# Patient Record
Sex: FEMALE | Race: WHITE | HISPANIC OR LATINO | Employment: FULL TIME | URBAN - METROPOLITAN AREA
[De-identification: names, ages, dates, MRNs, and addresses within clinical notes are randomized per-mention and may not be internally consistent; named-entity substitution may affect disease eponyms.]

---

## 2023-09-11 ENCOUNTER — LAB (OUTPATIENT)
Dept: LAB | Facility: LAB | Age: 33
End: 2023-09-11
Payer: COMMERCIAL

## 2023-09-11 ENCOUNTER — OFFICE VISIT (OUTPATIENT)
Dept: PRIMARY CARE | Facility: CLINIC | Age: 33
End: 2023-09-11
Payer: COMMERCIAL

## 2023-09-11 VITALS
HEART RATE: 51 BPM | SYSTOLIC BLOOD PRESSURE: 122 MMHG | BODY MASS INDEX: 26.35 KG/M2 | WEIGHT: 188.2 LBS | TEMPERATURE: 97.8 F | HEIGHT: 71 IN | OXYGEN SATURATION: 98 % | DIASTOLIC BLOOD PRESSURE: 72 MMHG

## 2023-09-11 DIAGNOSIS — Z98.84 HISTORY OF GASTRIC BYPASS: ICD-10-CM

## 2023-09-11 DIAGNOSIS — M25.512 ACUTE PAIN OF LEFT SHOULDER: ICD-10-CM

## 2023-09-11 DIAGNOSIS — R00.1 BRADYCARDIA: ICD-10-CM

## 2023-09-11 DIAGNOSIS — Z00.00 ROUTINE HEALTH MAINTENANCE: ICD-10-CM

## 2023-09-11 DIAGNOSIS — M41.9 SCOLIOSIS, UNSPECIFIED SCOLIOSIS TYPE, UNSPECIFIED SPINAL REGION: ICD-10-CM

## 2023-09-11 DIAGNOSIS — B35.4 TINEA CORPORIS: ICD-10-CM

## 2023-09-11 DIAGNOSIS — Z00.00 ROUTINE HEALTH MAINTENANCE: Primary | ICD-10-CM

## 2023-09-11 LAB
ALANINE AMINOTRANSFERASE (SGPT) (U/L) IN SER/PLAS: 33 U/L (ref 7–45)
ALBUMIN (G/DL) IN SER/PLAS: 4.5 G/DL (ref 3.4–5)
ALKALINE PHOSPHATASE (U/L) IN SER/PLAS: 42 U/L (ref 33–110)
ANION GAP IN SER/PLAS: 10 MMOL/L (ref 10–20)
ASPARTATE AMINOTRANSFERASE (SGOT) (U/L) IN SER/PLAS: 29 U/L (ref 9–39)
BILIRUBIN TOTAL (MG/DL) IN SER/PLAS: 0.5 MG/DL (ref 0–1.2)
CALCIUM (MG/DL) IN SER/PLAS: 9.6 MG/DL (ref 8.6–10.3)
CARBON DIOXIDE, TOTAL (MMOL/L) IN SER/PLAS: 29 MMOL/L (ref 21–32)
CHLORIDE (MMOL/L) IN SER/PLAS: 102 MMOL/L (ref 98–107)
CHOLESTEROL (MG/DL) IN SER/PLAS: 164 MG/DL (ref 0–199)
CHOLESTEROL IN HDL (MG/DL) IN SER/PLAS: 62.3 MG/DL
CHOLESTEROL/HDL RATIO: 2.6
CREATININE (MG/DL) IN SER/PLAS: 0.63 MG/DL (ref 0.5–1.05)
ERYTHROCYTE DISTRIBUTION WIDTH (RATIO) BY AUTOMATED COUNT: 11.9 % (ref 11.5–14.5)
ERYTHROCYTE MEAN CORPUSCULAR HEMOGLOBIN CONCENTRATION (G/DL) BY AUTOMATED: 32.5 G/DL (ref 32–36)
ERYTHROCYTE MEAN CORPUSCULAR VOLUME (FL) BY AUTOMATED COUNT: 97 FL (ref 80–100)
ERYTHROCYTES (10*6/UL) IN BLOOD BY AUTOMATED COUNT: 4.31 X10E12/L (ref 4–5.2)
GFR FEMALE: >90 ML/MIN/1.73M2
GLUCOSE (MG/DL) IN SER/PLAS: 84 MG/DL (ref 74–99)
HEMATOCRIT (%) IN BLOOD BY AUTOMATED COUNT: 41.6 % (ref 36–46)
HEMOGLOBIN (G/DL) IN BLOOD: 13.5 G/DL (ref 12–16)
LDL: 89 MG/DL (ref 0–99)
LEUKOCYTES (10*3/UL) IN BLOOD BY AUTOMATED COUNT: 4.1 X10E9/L (ref 4.4–11.3)
PLATELETS (10*3/UL) IN BLOOD AUTOMATED COUNT: 177 X10E9/L (ref 150–450)
POTASSIUM (MMOL/L) IN SER/PLAS: 4.3 MMOL/L (ref 3.5–5.3)
PROTEIN TOTAL: 7.3 G/DL (ref 6.4–8.2)
SODIUM (MMOL/L) IN SER/PLAS: 137 MMOL/L (ref 136–145)
THYROTROPIN (MIU/L) IN SER/PLAS BY DETECTION LIMIT <= 0.05 MIU/L: 3.46 MIU/L (ref 0.44–3.98)
TRIGLYCERIDE (MG/DL) IN SER/PLAS: 66 MG/DL (ref 0–149)
UREA NITROGEN (MG/DL) IN SER/PLAS: 16 MG/DL (ref 6–23)
VLDL: 13 MG/DL (ref 0–40)

## 2023-09-11 PROCEDURE — 36415 COLL VENOUS BLD VENIPUNCTURE: CPT

## 2023-09-11 PROCEDURE — 85027 COMPLETE CBC AUTOMATED: CPT

## 2023-09-11 PROCEDURE — 90471 IMMUNIZATION ADMIN: CPT | Performed by: STUDENT IN AN ORGANIZED HEALTH CARE EDUCATION/TRAINING PROGRAM

## 2023-09-11 PROCEDURE — 90686 IIV4 VACC NO PRSV 0.5 ML IM: CPT | Performed by: STUDENT IN AN ORGANIZED HEALTH CARE EDUCATION/TRAINING PROGRAM

## 2023-09-11 PROCEDURE — 99203 OFFICE O/P NEW LOW 30 MIN: CPT | Performed by: STUDENT IN AN ORGANIZED HEALTH CARE EDUCATION/TRAINING PROGRAM

## 2023-09-11 PROCEDURE — 80053 COMPREHEN METABOLIC PANEL: CPT

## 2023-09-11 PROCEDURE — 1036F TOBACCO NON-USER: CPT | Performed by: STUDENT IN AN ORGANIZED HEALTH CARE EDUCATION/TRAINING PROGRAM

## 2023-09-11 PROCEDURE — 84443 ASSAY THYROID STIM HORMONE: CPT

## 2023-09-11 PROCEDURE — 80061 LIPID PANEL: CPT

## 2023-09-11 RX ORDER — CREATINE MONOHYDRATE 100 %
POWDER (GRAM) MISCELLANEOUS
COMMUNITY

## 2023-09-11 RX ORDER — UBIDECARENONE 75 MG
500 CAPSULE ORAL DAILY
COMMUNITY

## 2023-09-11 RX ORDER — FERROUS SULFATE 325(65) MG
65 TABLET, DELAYED RELEASE (ENTERIC COATED) ORAL
COMMUNITY

## 2023-09-11 RX ORDER — FOLIC ACID 1 MG/1
TABLET ORAL DAILY
COMMUNITY

## 2023-09-11 RX ORDER — NYSTATIN 100000 [USP'U]/G
POWDER TOPICAL 2 TIMES DAILY
Qty: 60 G | Refills: 0 | Status: SHIPPED | OUTPATIENT
Start: 2023-09-11 | End: 2023-09-25

## 2023-09-11 RX ORDER — IBUPROFEN 200 MG
CAPSULE ORAL
COMMUNITY

## 2023-09-11 ASSESSMENT — ENCOUNTER SYMPTOMS
CHILLS: 0
DYSURIA: 0
LIGHT-HEADEDNESS: 0
VOMITING: 0
DIZZINESS: 0
FEVER: 0
ABDOMINAL PAIN: 0
NAUSEA: 0
DIAPHORESIS: 0
SHORTNESS OF BREATH: 0
DIARRHEA: 0

## 2023-09-11 ASSESSMENT — PATIENT HEALTH QUESTIONNAIRE - PHQ9
2. FEELING DOWN, DEPRESSED OR HOPELESS: NOT AT ALL
1. LITTLE INTEREST OR PLEASURE IN DOING THINGS: NOT AT ALL
SUM OF ALL RESPONSES TO PHQ9 QUESTIONS 1 AND 2: 0

## 2023-09-11 NOTE — PROGRESS NOTES
"Subjective   Patient ID: Tanika Amador is a 32 y.o. female who presents for New Patient Visit.  She is here to establish care.  Reports pmh scoliosis as child, cholecystectomy, partial hysterectomy, gastric bypass 5 years ago in [place].     Left shoulder pain present for about a week, [facility] Has felt clicking in the back for last few months. No obvious injury that worsened the shoulder. They also were in car last week driving from [place].     Right breast pain present in mid sternal and then to the right inferior breast region. Feels somewhat like chaffing and having sharp pains. Present for about 2 weeks. Sore and tender to the touch. Pain radiated around right rib region. Hasn't really felt any breast lumps. Some redness under right breast-lotion has helped some.     She has felt more bloated than usual.    Social history: Lifts weights 5 times a week and is quite physically active.  She works as a respiratory therapist.        Review of Systems   Constitutional:  Negative for chills, diaphoresis and fever.   HENT:  Negative for hearing loss.    Eyes:  Negative for visual disturbance.   Respiratory:  Negative for shortness of breath.    Cardiovascular:  Negative for chest pain.   Gastrointestinal:  Negative for abdominal pain, diarrhea, nausea and vomiting.   Endocrine: Negative for cold intolerance and heat intolerance.   Genitourinary:  Negative for dysuria.   Skin:  Negative for rash.   Neurological:  Negative for dizziness and light-headedness.       /72   Pulse 51   Temp 36.6 °C (97.8 °F)   Ht 1.797 m (5' 10.75\")   Wt 85.4 kg (188 lb 3.2 oz)   SpO2 98%   BMI 26.43 kg/m²     Objective   Physical Exam  Vitals reviewed. Exam conducted with a chaperone present.   HENT:      Head: Normocephalic.   Cardiovascular:      Rate and Rhythm: Normal rate and regular rhythm.   Pulmonary:      Effort: Pulmonary effort is normal. No respiratory distress.      " Breath sounds: Normal breath sounds.   Abdominal:      General: There is no distension.   Musculoskeletal:         General: No deformity.   Skin:     Coloration: Skin is not jaundiced.      Comments: Erythema under right medial inferior breast margin   Neurological:      Mental Status: She is alert.   Psychiatric:         Mood and Affect: Mood normal.         Behavior: Behavior normal.         Assessment/Plan   Problem List Items Addressed This Visit       Tinea corporis    Relevant Medications    nystatin (Mycostatin) 100,000 unit/gram powder    Routine health maintenance - Primary    Relevant Orders    CBC (Completed)    Comprehensive Metabolic Panel (Completed)    Lipid Panel (Completed)    TSH with reflex to Free T4 if abnormal (Completed)    Referral to Gynecology    History of gastric bypass    Bradycardia    Acute pain of left shoulder    Scoliosis   Scoliosis  -Will let us know when she wants to see orthopedics.     Left shoulder pain  - Continue stretching, let us know if persistent    Tinea corporis  - Seems to present with this based on history and exam.  Try nystatin powder for 2 weeks to see if helps.  -Let us know if not improving he routine labs ordered.    Bradycardia  - Patient reports is normal for her and she is quite physically active    Healthcare Maintenance  -Routine labs ordered  - Follow-up with me in 1 to 3 months for physical or sooner if needed.  -Requested old labs has may need to see bariatrics for postop follow-up but will monitor electrolytes for now.  -Referred to gynecology to establish care.

## 2023-09-12 DIAGNOSIS — D72.819 LEUKOPENIA, UNSPECIFIED TYPE: Primary | ICD-10-CM

## 2024-01-31 ENCOUNTER — LAB REQUISITION (OUTPATIENT)
Dept: LAB | Facility: HOSPITAL | Age: 34
End: 2024-01-31
Payer: COMMERCIAL

## 2024-01-31 DIAGNOSIS — Z31.83 ENCOUNTER FOR ASSISTED REPRODUCTIVE FERTILITY PROCEDURE CYCLE: ICD-10-CM

## 2024-01-31 LAB
ESTRADIOL SERPL-MCNC: 77 PG/ML
PROGEST SERPL-MCNC: 9.1 NG/ML

## 2024-01-31 PROCEDURE — 84144 ASSAY OF PROGESTERONE: CPT

## 2024-01-31 PROCEDURE — 82670 ASSAY OF TOTAL ESTRADIOL: CPT

## 2024-02-01 LAB — HOLD SPECIMEN: NORMAL

## 2024-02-05 ENCOUNTER — LAB REQUISITION (OUTPATIENT)
Dept: LAB | Facility: HOSPITAL | Age: 34
End: 2024-02-05
Payer: COMMERCIAL

## 2024-02-05 DIAGNOSIS — Z31.83 ENCOUNTER FOR ASSISTED REPRODUCTIVE FERTILITY PROCEDURE CYCLE: ICD-10-CM

## 2024-02-05 LAB
ESTRADIOL SERPL-MCNC: 149 PG/ML
HOLD SPECIMEN: NORMAL
PROGEST SERPL-MCNC: 0.7 NG/ML

## 2024-02-05 PROCEDURE — 82670 ASSAY OF TOTAL ESTRADIOL: CPT

## 2024-02-05 PROCEDURE — 84144 ASSAY OF PROGESTERONE: CPT

## 2024-02-07 ENCOUNTER — LAB REQUISITION (OUTPATIENT)
Dept: LAB | Facility: HOSPITAL | Age: 34
End: 2024-02-07
Payer: COMMERCIAL

## 2024-02-07 DIAGNOSIS — Z31.83 ENCOUNTER FOR ASSISTED REPRODUCTIVE FERTILITY PROCEDURE CYCLE: ICD-10-CM

## 2024-02-07 LAB
ESTRADIOL SERPL-MCNC: 296 PG/ML
HOLD SPECIMEN: NORMAL
PROGEST SERPL-MCNC: 0.4 NG/ML

## 2024-02-07 PROCEDURE — 84144 ASSAY OF PROGESTERONE: CPT

## 2024-02-07 PROCEDURE — 82670 ASSAY OF TOTAL ESTRADIOL: CPT

## 2024-02-09 ENCOUNTER — LAB REQUISITION (OUTPATIENT)
Dept: LAB | Facility: HOSPITAL | Age: 34
End: 2024-02-09
Payer: COMMERCIAL

## 2024-02-09 DIAGNOSIS — Z31.83 ENCOUNTER FOR ASSISTED REPRODUCTIVE FERTILITY PROCEDURE CYCLE: ICD-10-CM

## 2024-02-09 LAB
ESTRADIOL SERPL-MCNC: 730 PG/ML
HOLD SPECIMEN: NORMAL
PROGEST SERPL-MCNC: 0.4 NG/ML

## 2024-02-09 PROCEDURE — 84144 ASSAY OF PROGESTERONE: CPT

## 2024-02-09 PROCEDURE — 82670 ASSAY OF TOTAL ESTRADIOL: CPT

## 2024-02-12 ENCOUNTER — LAB REQUISITION (OUTPATIENT)
Dept: LAB | Facility: HOSPITAL | Age: 34
End: 2024-02-12
Payer: COMMERCIAL

## 2024-02-12 DIAGNOSIS — Z31.83 ENCOUNTER FOR ASSISTED REPRODUCTIVE FERTILITY PROCEDURE CYCLE: ICD-10-CM

## 2024-02-12 LAB
ESTRADIOL SERPL-MCNC: 2877 PG/ML
HOLD SPECIMEN: NORMAL
PROGEST SERPL-MCNC: 1.1 NG/ML

## 2024-02-12 PROCEDURE — 84144 ASSAY OF PROGESTERONE: CPT

## 2024-02-12 PROCEDURE — 82670 ASSAY OF TOTAL ESTRADIOL: CPT

## 2024-02-21 ENCOUNTER — LAB REQUISITION (OUTPATIENT)
Dept: LAB | Facility: HOSPITAL | Age: 34
End: 2024-02-21
Payer: COMMERCIAL

## 2024-02-21 DIAGNOSIS — Z31.83 ENCOUNTER FOR ASSISTED REPRODUCTIVE FERTILITY PROCEDURE CYCLE: ICD-10-CM

## 2024-02-21 LAB
ESTRADIOL SERPL-MCNC: 341 PG/ML
HOLD SPECIMEN: NORMAL
PROGEST SERPL-MCNC: 9 NG/ML

## 2024-02-21 PROCEDURE — 84144 ASSAY OF PROGESTERONE: CPT

## 2024-02-21 PROCEDURE — 82670 ASSAY OF TOTAL ESTRADIOL: CPT

## 2024-02-28 ENCOUNTER — LAB REQUISITION (OUTPATIENT)
Dept: LAB | Facility: HOSPITAL | Age: 34
End: 2024-02-28
Payer: COMMERCIAL

## 2024-02-28 DIAGNOSIS — Z31.83 ENCOUNTER FOR ASSISTED REPRODUCTIVE FERTILITY PROCEDURE CYCLE: ICD-10-CM

## 2024-02-28 LAB
ESTRADIOL SERPL-MCNC: 31 PG/ML
HOLD SPECIMEN: NORMAL
PROGEST SERPL-MCNC: 0.3 NG/ML

## 2024-02-28 PROCEDURE — 84144 ASSAY OF PROGESTERONE: CPT

## 2024-02-28 PROCEDURE — 82670 ASSAY OF TOTAL ESTRADIOL: CPT

## 2024-03-04 ENCOUNTER — LAB REQUISITION (OUTPATIENT)
Dept: LAB | Facility: HOSPITAL | Age: 34
End: 2024-03-04
Payer: COMMERCIAL

## 2024-03-04 DIAGNOSIS — Z31.83 ENCOUNTER FOR ASSISTED REPRODUCTIVE FERTILITY PROCEDURE CYCLE: ICD-10-CM

## 2024-03-04 LAB
ESTRADIOL SERPL-MCNC: 409 PG/ML
PROGEST SERPL-MCNC: 0.3 NG/ML

## 2024-03-04 PROCEDURE — 82670 ASSAY OF TOTAL ESTRADIOL: CPT

## 2024-03-04 PROCEDURE — 84144 ASSAY OF PROGESTERONE: CPT

## 2024-03-05 LAB — HOLD SPECIMEN: NORMAL

## 2024-03-06 ENCOUNTER — LAB REQUISITION (OUTPATIENT)
Dept: LAB | Facility: HOSPITAL | Age: 34
End: 2024-03-06
Payer: COMMERCIAL

## 2024-03-06 DIAGNOSIS — Z31.83 ENCOUNTER FOR ASSISTED REPRODUCTIVE FERTILITY PROCEDURE CYCLE: ICD-10-CM

## 2024-03-06 PROCEDURE — 84144 ASSAY OF PROGESTERONE: CPT

## 2024-03-06 PROCEDURE — 82670 ASSAY OF TOTAL ESTRADIOL: CPT

## 2024-03-07 LAB
ESTRADIOL SERPL-MCNC: 978 PG/ML
HOLD SPECIMEN: NORMAL
PROGEST SERPL-MCNC: 0.6 NG/ML

## 2024-03-08 ENCOUNTER — LAB REQUISITION (OUTPATIENT)
Dept: LAB | Facility: HOSPITAL | Age: 34
End: 2024-03-08
Payer: COMMERCIAL

## 2024-03-08 DIAGNOSIS — Z31.83 ENCOUNTER FOR ASSISTED REPRODUCTIVE FERTILITY PROCEDURE CYCLE: ICD-10-CM

## 2024-03-08 PROCEDURE — 82670 ASSAY OF TOTAL ESTRADIOL: CPT

## 2024-03-08 PROCEDURE — 84144 ASSAY OF PROGESTERONE: CPT

## 2024-03-09 LAB
ESTRADIOL SERPL-MCNC: 2489 PG/ML
PROGEST SERPL-MCNC: 0.9 NG/ML

## 2024-03-11 LAB — HOLD SPECIMEN: NORMAL

## 2024-04-16 ENCOUNTER — HOSPITAL ENCOUNTER (OUTPATIENT)
Dept: RADIOLOGY | Facility: EXTERNAL LOCATION | Age: 34
Discharge: HOME | End: 2024-04-16

## 2024-04-16 ENCOUNTER — HOSPITAL ENCOUNTER (INPATIENT)
Facility: HOSPITAL | Age: 34
LOS: 6 days | Discharge: HOME | DRG: 200 | End: 2024-04-23
Attending: EMERGENCY MEDICINE | Admitting: SURGERY
Payer: COMMERCIAL

## 2024-04-16 ENCOUNTER — OFFICE VISIT (OUTPATIENT)
Dept: URGENT CARE | Facility: CLINIC | Age: 34
End: 2024-04-16
Payer: COMMERCIAL

## 2024-04-16 VITALS
HEART RATE: 60 BPM | SYSTOLIC BLOOD PRESSURE: 140 MMHG | RESPIRATION RATE: 20 BRPM | OXYGEN SATURATION: 98 % | TEMPERATURE: 98 F | DIASTOLIC BLOOD PRESSURE: 90 MMHG

## 2024-04-16 DIAGNOSIS — R07.89 CHEST WALL PAIN: ICD-10-CM

## 2024-04-16 DIAGNOSIS — J93.9 PNEUMOTHORAX, LEFT: ICD-10-CM

## 2024-04-16 DIAGNOSIS — S27.0XXA TRAUMATIC PNEUMOTHORAX, INITIAL ENCOUNTER: ICD-10-CM

## 2024-04-16 DIAGNOSIS — R07.89 CHEST WALL PAIN: Primary | ICD-10-CM
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
## 2024-04-16 PROCEDURE — 99285 EMERGENCY DEPT VISIT HI MDM: CPT

## 2024-04-16 PROCEDURE — 99285 EMERGENCY DEPT VISIT HI MDM: CPT | Performed by: EMERGENCY MEDICINE

## 2024-04-16 PROCEDURE — G0390 TRAUMA RESPONS W/HOSP CRITI: HCPCS

## 2024-04-16 PROCEDURE — 99203 OFFICE O/P NEW LOW 30 MIN: CPT | Performed by: PHYSICIAN ASSISTANT

## 2024-04-16 ASSESSMENT — ENCOUNTER SYMPTOMS
CHEST TIGHTNESS: 1
SHORTNESS OF BREATH: 1

## 2024-04-16 ASSESSMENT — PAIN SCALES - GENERAL: PAINLEVEL: 5

## 2024-04-16 NOTE — PROGRESS NOTES
Subjective   Patient ID: Tanika Amador is a 33 y.o. female.    Patient is a 33-year-old female who complains of worsening shortness of breath and left chest wall pain that she has been experiencing since sustaining a fall injury 4 days ago.  Patient states that she was on vacation in [place] when she fell striking her left chest wall.  Patient states that she did attempt to obtain medical care at a [facility] hospital outside of the tourist area but reports nothing was done.  Patient states no x-rays were obtained.  Patient then returned home to [place] via airline and has continued to experience shortness of breath.  Patient has no history of asthma or COPD and does not smoke.  Patient states that her right chest is asymptomatic and nontender.  Patient does describe left chest tightness and states that she becomes short of breath with walking short distances.  Patient has no additional complaints of pain or injury.  Patient also reports no fever, chills, cough or other illness symptoms.      Shortness of Breath  Associated symptoms: chest pain    The following portions of the chart were reviewed this encounter and updated as appropriate:       Review of Systems   Respiratory:  Positive for chest tightness and shortness of breath.    Cardiovascular:  Positive for chest pain.   All other systems reviewed and are negative.  Objective   Physical Exam  Vitals and nursing note reviewed.   Constitutional:       Appearance: Normal appearance. She is normal weight.   HENT:      Head: Normocephalic and atraumatic.      Right Ear: Tympanic membrane, ear canal and external ear normal.      Left Ear: Tympanic membrane, ear canal and external ear normal.      Nose: Nose normal. No congestion or rhinorrhea.      Mouth/Throat:      Mouth: Mucous membranes are moist.      Pharynx: Oropharynx is clear. No oropharyngeal exudate or posterior oropharyngeal erythema.   Eyes:      Extraocular Movements: Extraocular movements intact.       Conjunctiva/sclera: Conjunctivae normal.      Pupils: Pupils are equal, round, and reactive to light.   Cardiovascular:      Rate and Rhythm: Normal rate and regular rhythm.      Pulses: Normal pulses.      Heart sounds: Normal heart sounds.   Pulmonary:      Effort: Pulmonary effort is normal. No respiratory distress.      Breath sounds: Normal breath sounds. No stridor. No wheezing, rhonchi or rales.      Comments: Decreased breath sounds are noted primarily to the left apex.  There is clearly reproducible chest wall tenderness to the left lateral chest.  No crepitus or deformity is noted.  Overlying skin is clear with no erythema or ecchymosis noted.  Chest movement is symmetric and respiratory effort is relaxed.  Pulse oximeter saturation is 98% on room air.  Patient is conversing in complete sentences.  Chest:      Chest wall: Tenderness present.   Musculoskeletal:      Cervical back: Normal range of motion and neck supple.   Skin:     General: Skin is warm and dry.      Capillary Refill: Capillary refill takes less than 2 seconds.   Neurological:      General: No focal deficit present.      Mental Status: She is alert and oriented to person, place, and time.   Psychiatric:         Mood and Affect: Mood normal.         Behavior: Behavior normal.         Thought Content: Thought content normal.         Judgment: Judgment normal.     Assessment/Plan   Physical exam findings as noted above.  X-ray left ribs with PA chest demonstrates a significant left pneumothorax estimated at 20 to 30% on my review of the images.  Patient's vital signs remain completely normal and the patient exhibits no respiratory distress.  Patient was very clearly instructed to report to the [facility] emergency department in [place] immediately after departing this urgent care facility.  Patient demonstrates excellent understanding of this instruction and states that she will proceed immediately for [facility].    CLINICAL IMPRESSION:  Left Pneumothorax 20-30%    Diagnoses and all orders for this visit:  Chest wall pain  -     XR ribs 2 views left w chest pa or ap; Future  Pneumothorax, left    Patient disposition: Home

## 2024-04-17 ENCOUNTER — APPOINTMENT (OUTPATIENT)
Dept: RADIOLOGY | Facility: HOSPITAL | Age: 34
DRG: 200 | End: 2024-04-17
Payer: COMMERCIAL

## 2024-04-17 ENCOUNTER — APPOINTMENT (OUTPATIENT)
Dept: CARDIOLOGY | Facility: HOSPITAL | Age: 34
DRG: 200 | End: 2024-04-17
Payer: COMMERCIAL
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
## 2024-04-17 LAB
ABO GROUP (TYPE) IN BLOOD: NORMAL
ALBUMIN SERPL BCP-MCNC: 3.6 G/DL (ref 3.4–5)
ANION GAP SERPL CALC-SCNC: 12 MMOL/L (ref 10–20)
ANTIBODY SCREEN: NORMAL
BUN SERPL-MCNC: 11 MG/DL (ref 6–23)
CALCIUM SERPL-MCNC: 8.8 MG/DL (ref 8.6–10.6)
CHLORIDE SERPL-SCNC: 106 MMOL/L (ref 98–107)
CO2 SERPL-SCNC: 24 MMOL/L (ref 21–32)
CREAT SERPL-MCNC: 0.58 MG/DL (ref 0.5–1.05)
EGFRCR SERPLBLD CKD-EPI 2021: >90 ML/MIN/1.73M*2
ERYTHROCYTE [DISTWIDTH] IN BLOOD BY AUTOMATED COUNT: 11.1 % (ref 11.5–14.5)
ERYTHROCYTE [DISTWIDTH] IN BLOOD BY AUTOMATED COUNT: 11.4 % (ref 11.5–14.5)
GLUCOSE SERPL-MCNC: 96 MG/DL (ref 74–99)
HCT VFR BLD AUTO: 33.9 % (ref 36–46)
HCT VFR BLD AUTO: 34.8 % (ref 36–46)
HGB BLD-MCNC: 11.8 G/DL (ref 12–16)
HGB BLD-MCNC: 11.9 G/DL (ref 12–16)
MAGNESIUM SERPL-MCNC: 1.9 MG/DL (ref 1.6–2.4)
MCH RBC QN AUTO: 31.4 PG (ref 26–34)
MCH RBC QN AUTO: 31.7 PG (ref 26–34)
MCHC RBC AUTO-ENTMCNC: 34.2 G/DL (ref 32–36)
MCHC RBC AUTO-ENTMCNC: 34.8 G/DL (ref 32–36)
MCV RBC AUTO: 91 FL (ref 80–100)
MCV RBC AUTO: 92 FL (ref 80–100)
NRBC BLD-RTO: 0 /100 WBCS (ref 0–0)
NRBC BLD-RTO: 0 /100 WBCS (ref 0–0)
PHOSPHATE SERPL-MCNC: 3.8 MG/DL (ref 2.5–4.9)
PLATELET # BLD AUTO: 178 X10*3/UL (ref 150–450)
PLATELET # BLD AUTO: 187 X10*3/UL (ref 150–450)
POTASSIUM SERPL-SCNC: 3.6 MMOL/L (ref 3.5–5.3)
RBC # BLD AUTO: 3.72 X10*6/UL (ref 4–5.2)
RBC # BLD AUTO: 3.79 X10*6/UL (ref 4–5.2)
RH FACTOR (ANTIGEN D): NORMAL
SODIUM SERPL-SCNC: 138 MMOL/L (ref 136–145)
WBC # BLD AUTO: 5 X10*3/UL (ref 4.4–11.3)
WBC # BLD AUTO: 5.3 X10*3/UL (ref 4.4–11.3)

## 2024-04-17 PROCEDURE — 80069 RENAL FUNCTION PANEL: CPT | Performed by: PHYSICIAN ASSISTANT

## 2024-04-17 PROCEDURE — 99222 1ST HOSP IP/OBS MODERATE 55: CPT | Performed by: PHYSICIAN ASSISTANT

## 2024-04-17 PROCEDURE — 71046 X-RAY EXAM CHEST 2 VIEWS: CPT

## 2024-04-17 PROCEDURE — 71045 X-RAY EXAM CHEST 1 VIEW: CPT | Performed by: RADIOLOGY

## 2024-04-17 PROCEDURE — 93010 ELECTROCARDIOGRAM REPORT: CPT | Performed by: INTERNAL MEDICINE

## 2024-04-17 PROCEDURE — 94762 N-INVAS EAR/PLS OXIMTRY CONT: CPT

## 2024-04-17 PROCEDURE — 86901 BLOOD TYPING SEROLOGIC RH(D): CPT | Performed by: PHYSICIAN ASSISTANT

## 2024-04-17 PROCEDURE — 93005 ELECTROCARDIOGRAM TRACING: CPT

## 2024-04-17 PROCEDURE — 71046 X-RAY EXAM CHEST 2 VIEWS: CPT | Performed by: RADIOLOGY

## 2024-04-17 PROCEDURE — 85027 COMPLETE CBC AUTOMATED: CPT | Performed by: PHYSICIAN ASSISTANT

## 2024-04-17 PROCEDURE — 36415 COLL VENOUS BLD VENIPUNCTURE: CPT | Performed by: PHYSICIAN ASSISTANT

## 2024-04-17 PROCEDURE — 2500000004 HC RX 250 GENERAL PHARMACY W/ HCPCS (ALT 636 FOR OP/ED): Performed by: PHYSICIAN ASSISTANT

## 2024-04-17 PROCEDURE — 1100000001 HC PRIVATE ROOM DAILY

## 2024-04-17 PROCEDURE — 83735 ASSAY OF MAGNESIUM: CPT | Performed by: PHYSICIAN ASSISTANT

## 2024-04-17 PROCEDURE — 2500000001 HC RX 250 WO HCPCS SELF ADMINISTERED DRUGS (ALT 637 FOR MEDICARE OP): Performed by: PHYSICIAN ASSISTANT

## 2024-04-17 PROCEDURE — 71045 X-RAY EXAM CHEST 1 VIEW: CPT

## 2024-04-17 PROCEDURE — 2500000001 HC RX 250 WO HCPCS SELF ADMINISTERED DRUGS (ALT 637 FOR MEDICARE OP): Performed by: STUDENT IN AN ORGANIZED HEALTH CARE EDUCATION/TRAINING PROGRAM

## 2024-04-17 PROCEDURE — 2500000004 HC RX 250 GENERAL PHARMACY W/ HCPCS (ALT 636 FOR OP/ED): Performed by: STUDENT IN AN ORGANIZED HEALTH CARE EDUCATION/TRAINING PROGRAM

## 2024-04-17 RX ORDER — METHOCARBAMOL 500 MG/1
500 TABLET, FILM COATED ORAL EVERY 6 HOURS
Status: DISCONTINUED | OUTPATIENT
Start: 2024-04-17 | End: 2024-04-22

## 2024-04-17 RX ORDER — OXYCODONE HYDROCHLORIDE 5 MG/1
10 TABLET ORAL EVERY 4 HOURS PRN
Status: DISCONTINUED | OUTPATIENT
Start: 2024-04-17 | End: 2024-04-23 | Stop reason: HOSPADM

## 2024-04-17 RX ORDER — LIDOCAINE 560 MG/1
1 PATCH PERCUTANEOUS; TOPICAL; TRANSDERMAL DAILY
Status: DISCONTINUED | OUTPATIENT
Start: 2024-04-18 | End: 2024-04-23 | Stop reason: HOSPADM

## 2024-04-17 RX ORDER — ENOXAPARIN SODIUM 100 MG/ML
30 INJECTION SUBCUTANEOUS EVERY 12 HOURS
Status: DISCONTINUED | OUTPATIENT
Start: 2024-04-17 | End: 2024-04-23 | Stop reason: HOSPADM

## 2024-04-17 RX ORDER — ONDANSETRON HYDROCHLORIDE 2 MG/ML
4 INJECTION, SOLUTION INTRAVENOUS ONCE
Status: COMPLETED | OUTPATIENT
Start: 2024-04-17 | End: 2024-04-17

## 2024-04-17 RX ORDER — OXYCODONE HYDROCHLORIDE 5 MG/1
5 TABLET ORAL EVERY 6 HOURS PRN
Status: DISCONTINUED | OUTPATIENT
Start: 2024-04-17 | End: 2024-04-17

## 2024-04-17 RX ORDER — OXYCODONE HYDROCHLORIDE 5 MG/1
5 TABLET ORAL EVERY 4 HOURS PRN
Status: DISCONTINUED | OUTPATIENT
Start: 2024-04-17 | End: 2024-04-23 | Stop reason: HOSPADM

## 2024-04-17 RX ORDER — HYDROMORPHONE HYDROCHLORIDE 1 MG/ML
0.2 INJECTION, SOLUTION INTRAMUSCULAR; INTRAVENOUS; SUBCUTANEOUS EVERY 2 HOUR PRN
Status: DISCONTINUED | OUTPATIENT
Start: 2024-04-17 | End: 2024-04-22

## 2024-04-17 RX ORDER — AMOXICILLIN 250 MG
2 CAPSULE ORAL 2 TIMES DAILY
Status: DISCONTINUED | OUTPATIENT
Start: 2024-04-17 | End: 2024-04-23 | Stop reason: HOSPADM

## 2024-04-17 RX ORDER — ACETAMINOPHEN 325 MG/1
650 TABLET ORAL EVERY 4 HOURS
Status: DISCONTINUED | OUTPATIENT
Start: 2024-04-17 | End: 2024-04-23 | Stop reason: HOSPADM

## 2024-04-17 RX ORDER — OXYCODONE HYDROCHLORIDE 5 MG/1
10 TABLET ORAL EVERY 6 HOURS PRN
Status: DISCONTINUED | OUTPATIENT
Start: 2024-04-17 | End: 2024-04-17

## 2024-04-17 RX ORDER — HYDROMORPHONE HYDROCHLORIDE 1 MG/ML
0.5 INJECTION, SOLUTION INTRAMUSCULAR; INTRAVENOUS; SUBCUTANEOUS ONCE
Status: COMPLETED | OUTPATIENT
Start: 2024-04-17 | End: 2024-04-17

## 2024-04-17 RX ORDER — NALOXONE HYDROCHLORIDE 0.4 MG/ML
0.2 INJECTION, SOLUTION INTRAMUSCULAR; INTRAVENOUS; SUBCUTANEOUS EVERY 5 MIN PRN
Status: DISCONTINUED | OUTPATIENT
Start: 2024-04-17 | End: 2024-04-23 | Stop reason: HOSPADM

## 2024-04-17 RX ADMIN — OXYCODONE HYDROCHLORIDE 5 MG: 5 TABLET ORAL at 17:09

## 2024-04-17 RX ADMIN — METHOCARBAMOL TABLETS 500 MG: 500 TABLET, COATED ORAL at 15:53

## 2024-04-17 RX ADMIN — ENOXAPARIN SODIUM 30 MG: 100 INJECTION SUBCUTANEOUS at 04:24

## 2024-04-17 RX ADMIN — SENNOSIDES AND DOCUSATE SODIUM 2 TABLET: 8.6; 5 TABLET ORAL at 20:07

## 2024-04-17 RX ADMIN — ACETAMINOPHEN 650 MG: 325 TABLET ORAL at 14:05

## 2024-04-17 RX ADMIN — ENOXAPARIN SODIUM 30 MG: 100 INJECTION SUBCUTANEOUS at 15:53

## 2024-04-17 RX ADMIN — HYDROMORPHONE HYDROCHLORIDE 0.2 MG: 1 INJECTION, SOLUTION INTRAMUSCULAR; INTRAVENOUS; SUBCUTANEOUS at 05:25

## 2024-04-17 RX ADMIN — METHOCARBAMOL TABLETS 500 MG: 500 TABLET, COATED ORAL at 04:24

## 2024-04-17 RX ADMIN — HYDROMORPHONE HYDROCHLORIDE 0.2 MG: 1 INJECTION, SOLUTION INTRAMUSCULAR; INTRAVENOUS; SUBCUTANEOUS at 12:00

## 2024-04-17 RX ADMIN — ONDANSETRON 4 MG: 2 INJECTION INTRAMUSCULAR; INTRAVENOUS at 16:31

## 2024-04-17 RX ADMIN — OXYCODONE HYDROCHLORIDE 5 MG: 5 TABLET ORAL at 23:41

## 2024-04-17 RX ADMIN — ACETAMINOPHEN 650 MG: 325 TABLET ORAL at 02:06

## 2024-04-17 RX ADMIN — ACETAMINOPHEN 650 MG: 325 TABLET ORAL at 17:07

## 2024-04-17 RX ADMIN — HYDROMORPHONE HYDROCHLORIDE 0.5 MG: 1 INJECTION, SOLUTION INTRAMUSCULAR; INTRAVENOUS; SUBCUTANEOUS at 02:06

## 2024-04-17 RX ADMIN — ACETAMINOPHEN 650 MG: 325 TABLET ORAL at 05:23

## 2024-04-17 RX ADMIN — OXYCODONE HYDROCHLORIDE 10 MG: 5 TABLET ORAL at 03:30

## 2024-04-17 RX ADMIN — ACETAMINOPHEN 650 MG: 325 TABLET ORAL at 21:21

## 2024-04-17 RX ADMIN — METHOCARBAMOL TABLETS 500 MG: 500 TABLET, COATED ORAL at 21:21

## 2024-04-17 RX ADMIN — ACETAMINOPHEN 650 MG: 325 TABLET ORAL at 08:53

## 2024-04-17 RX ADMIN — SENNOSIDES AND DOCUSATE SODIUM 2 TABLET: 8.6; 5 TABLET ORAL at 08:54

## 2024-04-17 RX ADMIN — OXYCODONE HYDROCHLORIDE 10 MG: 5 TABLET ORAL at 08:53

## 2024-04-17 RX ADMIN — METHOCARBAMOL TABLETS 500 MG: 500 TABLET, COATED ORAL at 10:11

## 2024-04-17 SDOH — SOCIAL STABILITY: SOCIAL INSECURITY: DO YOU FEEL ANYONE HAS EXPLOITED OR TAKEN ADVANTAGE OF YOU FINANCIALLY OR OF YOUR PERSONAL PROPERTY?: NO

## 2024-04-17 SDOH — SOCIAL STABILITY: SOCIAL INSECURITY: HAVE YOU HAD THOUGHTS OF HARMING ANYONE ELSE?: NO

## 2024-04-17 SDOH — SOCIAL STABILITY: SOCIAL INSECURITY: HAVE YOU HAD ANY THOUGHTS OF HARMING ANYONE ELSE?: NO

## 2024-04-17 SDOH — SOCIAL STABILITY: SOCIAL INSECURITY: ABUSE: ADULT

## 2024-04-17 SDOH — SOCIAL STABILITY: SOCIAL INSECURITY: DO YOU FEEL UNSAFE GOING BACK TO THE PLACE WHERE YOU ARE LIVING?: NO

## 2024-04-17 SDOH — SOCIAL STABILITY: SOCIAL INSECURITY: ARE THERE ANY APPARENT SIGNS OF INJURIES/BEHAVIORS THAT COULD BE RELATED TO ABUSE/NEGLECT?: NO

## 2024-04-17 SDOH — SOCIAL STABILITY: SOCIAL INSECURITY: ARE YOU OR HAVE YOU BEEN THREATENED OR ABUSED PHYSICALLY, EMOTIONALLY, OR SEXUALLY BY ANYONE?: NO

## 2024-04-17 SDOH — SOCIAL STABILITY: SOCIAL INSECURITY: HAS ANYONE EVER THREATENED TO HURT YOUR FAMILY OR YOUR PETS?: NO

## 2024-04-17 ASSESSMENT — COGNITIVE AND FUNCTIONAL STATUS - GENERAL
DAILY ACTIVITIY SCORE: 24
MOBILITY SCORE: 24
MOBILITY SCORE: 24
DAILY ACTIVITIY SCORE: 24
PATIENT BASELINE BEDBOUND: NO
MOBILITY SCORE: 24
DAILY ACTIVITIY SCORE: 24

## 2024-04-17 ASSESSMENT — LIFESTYLE VARIABLES
AUDIT-C TOTAL SCORE: 2
HOW OFTEN DO YOU HAVE 6 OR MORE DRINKS ON ONE OCCASION: NEVER
SKIP TO QUESTIONS 9-10: 1
HOW OFTEN DO YOU HAVE A DRINK CONTAINING ALCOHOL: 2-4 TIMES A MONTH
HAVE YOU EVER FELT YOU SHOULD CUT DOWN ON YOUR DRINKING: NO
TOTAL SCORE: 0
HOW MANY STANDARD DRINKS CONTAINING ALCOHOL DO YOU HAVE ON A TYPICAL DAY: 1 OR 2
AUDIT-C TOTAL SCORE: 2
EVER HAD A DRINK FIRST THING IN THE MORNING TO STEADY YOUR NERVES TO GET RID OF A HANGOVER: NO
HAVE PEOPLE ANNOYED YOU BY CRITICIZING YOUR DRINKING: NO
EVER FELT BAD OR GUILTY ABOUT YOUR DRINKING: NO

## 2024-04-17 ASSESSMENT — PATIENT HEALTH QUESTIONNAIRE - PHQ9
2. FEELING DOWN, DEPRESSED OR HOPELESS: NOT AT ALL
SUM OF ALL RESPONSES TO PHQ9 QUESTIONS 1 & 2: 0
1. LITTLE INTEREST OR PLEASURE IN DOING THINGS: NOT AT ALL

## 2024-04-17 ASSESSMENT — PAIN DESCRIPTION - LOCATION
LOCATION: CHEST
LOCATION: RIB CAGE
LOCATION: CHEST
LOCATION: HEAD
LOCATION: HEAD

## 2024-04-17 ASSESSMENT — ENCOUNTER SYMPTOMS
FLANK PAIN: 0
DIFFICULTY URINATING: 0
DIARRHEA: 0
ACTIVITY CHANGE: 0
HEADACHES: 0
ABDOMINAL DISTENTION: 0
AGITATION: 0
WHEEZING: 0
VOMITING: 0
APPETITE CHANGE: 0
SHORTNESS OF BREATH: 1
ABDOMINAL PAIN: 0
BACK PAIN: 0
SEIZURES: 0
NAUSEA: 0
NUMBNESS: 0
WEAKNESS: 0
NECK PAIN: 0

## 2024-04-17 ASSESSMENT — PAIN - FUNCTIONAL ASSESSMENT
PAIN_FUNCTIONAL_ASSESSMENT: 0-10
PAIN_FUNCTIONAL_ASSESSMENT: 0-10
PAIN_FUNCTIONAL_ASSESSMENT: WONG-BAKER FACES
PAIN_FUNCTIONAL_ASSESSMENT: 0-10
PAIN_FUNCTIONAL_ASSESSMENT: 0-10
PAIN_FUNCTIONAL_ASSESSMENT: PAINAD (PAIN ASSESSMENT IN ADVANCED DEMENTIA SCALE)
PAIN_FUNCTIONAL_ASSESSMENT: PAINAD (PAIN ASSESSMENT IN ADVANCED DEMENTIA SCALE)
PAIN_FUNCTIONAL_ASSESSMENT: 0-10

## 2024-04-17 ASSESSMENT — COLUMBIA-SUICIDE SEVERITY RATING SCALE - C-SSRS
2. HAVE YOU ACTUALLY HAD ANY THOUGHTS OF KILLING YOURSELF?: NO
1. IN THE PAST MONTH, HAVE YOU WISHED YOU WERE DEAD OR WISHED YOU COULD GO TO SLEEP AND NOT WAKE UP?: NO
6. HAVE YOU EVER DONE ANYTHING, STARTED TO DO ANYTHING, OR PREPARED TO DO ANYTHING TO END YOUR LIFE?: NO

## 2024-04-17 ASSESSMENT — ACTIVITIES OF DAILY LIVING (ADL)
LACK_OF_TRANSPORTATION: NO
HEARING - LEFT EAR: FUNCTIONAL
FEEDING YOURSELF: INDEPENDENT
DRESSING YOURSELF: INDEPENDENT
JUDGMENT_ADEQUATE_SAFELY_COMPLETE_DAILY_ACTIVITIES: YES
PATIENT'S MEMORY ADEQUATE TO SAFELY COMPLETE DAILY ACTIVITIES?: YES
TOILETING: INDEPENDENT
HEARING - RIGHT EAR: FUNCTIONAL
BATHING: INDEPENDENT
ASSISTIVE_DEVICE: EYEGLASSES
LACK_OF_TRANSPORTATION: NO
ADEQUATE_TO_COMPLETE_ADL: YES
WALKS IN HOME: INDEPENDENT
GROOMING: INDEPENDENT

## 2024-04-17 ASSESSMENT — PAIN SCALES - GENERAL
PAINLEVEL_OUTOF10: 7
PAINLEVEL_OUTOF10: 6
PAINLEVEL_OUTOF10: 8
PAINLEVEL_OUTOF10: 0 - NO PAIN
PAINLEVEL_OUTOF10: 8
PAINLEVEL_OUTOF10: 0 - NO PAIN

## 2024-04-17 ASSESSMENT — PAIN DESCRIPTION - ORIENTATION
ORIENTATION: LEFT
ORIENTATION: LEFT

## 2024-04-17 ASSESSMENT — PAIN SCALES - WONG BAKER: WONGBAKER_NUMERICALRESPONSE: HURTS LITTLE BIT

## 2024-04-17 ASSESSMENT — PAIN DESCRIPTION - PROGRESSION: CLINICAL_PROGRESSION: NOT CHANGED

## 2024-04-17 NOTE — PROGRESS NOTES
Pharmacy Medication History Review    Adriana Obando is a 33 y.o. female admitted for Fall, initial encounter. Pharmacy reviewed the patient's [id number] medications and allergies for accuracy.    The list below reflects the updated PTA list. Comments regarding how patient may be taking medications differently can be found in the Admit Orders Activity  None        The list below reflects the updated allergy list. Please review each documented allergy for additional clarification and justification.  Allergies  Reviewed by Marlee Jerry RN on 4/17/2024   No Known Allergies         Patient accepts M2B at discharge. Pharmacy has been updated to [facility].    Sources used to complete the med history include   Allergy list epic   Epic dispense history   [facility] ( none )   Patient interview     Below are additional concerns with the patient's PTA list.  Patient states that she takes no home medications or supplements - patients lack of medication dispense history supports this statement     Tim Mejia CP  Transitions of Care Pharmacy Technician  [facility]  Please reach out via [employer] Secure Chat for questions, or if no response call [phone] or [employer] “MedRec”

## 2024-04-17 NOTE — PROGRESS NOTES
04/17/24 1315   Discharge Planning   Living Arrangements Spouse/significant other   Support Systems Spouse/significant other;Family members   Assistance Needed Independent prior to admission   Type of Residence Private residence   Number of Stairs to Enter Residence 0   Number of Stairs Within Residence 13   Do you have animals or pets at home? Yes   Type of Animals or Pets 1 Dog   Who is requesting discharge planning? Provider   Home or Post Acute Services None   Patient expects to be discharged to: home no needs   Does the patient need discharge transport arranged? No   Financial Resource Strain   How hard is it for you to pay for the very basics like food, housing, medical care, and heating? Not hard   Housing Stability   In the last 12 months, was there a time when you were not able to pay the mortgage or rent on time? N   In the last 12 months, how many places have you lived? 1   In the last 12 months, was there a time when you did not have a steady place to sleep or slept in a shelter (including now)? N   Transportation Needs   In the past 12 months, has lack of transportation kept you from medical appointments or from getting medications? no   In the past 12 months, has lack of transportation kept you from meetings, work, or from getting things needed for daily living? No     Transitional Care Coordinator Note: TCC met with patient introduced self and role to complete assessment (see above) and discuss discharge planning. Patient confirmed demographics:    Address: [address] [place] [address] [place] [address]  Alternate/Emergency Contact: Derrick Amador () [phone]  Patient Contact: [phone]  DME: None   Homecare: None   Falls: 2 falls   PCP: Dr. Germain Wallis   Ph[facility]acy: [facility] [place]   Insurance: [place] [employer] ([employer]) [phone]    Patient lives with  and dog in two [place] home. Patient independent prior to admission. Per patient  can assist with support upon discharge.  Per medical team (trauma) patient is not medically ready, pending follow up chest x-reay. Discharge dispo: Plan for patient to discharge home with no needs. TCC informed an educated patient on discharge plan. Patient expressed understanding and agreeable to discharge plan. Per patient  to assist with transportation home.     Radha Fernando RN BSN   Transitional Care Coordinator

## 2024-04-17 NOTE — ED TRIAGE NOTES
Reports from [facility] s/p fallwhile vacationing in [place]. Pt fell 4/12/24, from standing into a hard, corner of a chair. Pt was seen in [place] and told she had rib fx. After 4 days of being SOB, pt reported to ED and was found to have a left sided pneumothorax.     Pt arrives with left sided chest tube placed via sending facility.

## 2024-04-17 NOTE — CARE PLAN
The patient's goals for the shift include  pain control.    The clinical goals for the shift include free of injury

## 2024-04-17 NOTE — CARE PLAN
The patient's goals for the shift include  pain control    The clinical goals for the shift include free of injury

## 2024-04-17 NOTE — H&P
"[facility]  TRAUMA SERVICE - HISTORY AND PHYSICAL / CONSULT    Patient Name: Adriana Obando  MRN: [id number]  Admit Date: 416  : 1990  AGE: 33 y.o.   GENDER: female  ==============================================================================  MECHANISM OF INJURY / CHIEF COMPLAINT:   33 YOF transferred from OSH s/p fall last Friday,  while vacationing in [place]. Patient states that she tripped while putting on her shorts and struck her left chest into the sharp edge of a chair.  Initially went to hospital in [place] where imaging obtained and noted \"a rib fracture\". She ultimately flew home yesterday, 4/15, and continued to have left chest pain and difficulty breathing.  She presented to an urgent care where CXR noted ptx. She then was transferred to [facility] where L ptx again noted with multiple rib fx.  Chest tube placed and patient transferred to [facility] for further care. On arrival, patient states that she has some left chest pain but that is all. She has been tolerating a diet for the last 5 days w/o concerns.     LOC (yes/no?): no  Anticoagulant / Anti-platelet Rx? (for what dx?): no  Referring Facility Name (N/A for scene EMR run): [id number]    INJURIES:   L 7th rib fx with ptx    OTHER MEDICAL PROBLEMS:  None    INCIDENTAL FINDINGS:  N/a    ==============================================================================  ADMISSION PLAN OF CARE:  CXR; appropriate CT placement w/o definitive residual ptx  Multimodal pain control with rafaela. Tylenol, PRN oxy, Dilaudid BT, Robaxin  Can add Toradol if needed  Okay for diet  Frequent IS  Daily CXR while chest tube in place   Consultants notified (specialty, provider name, time): n/a    Dispo: RNF    Total face to face time spent with patient/family of 35 minutes, with >50% of the time spent discussing plan of care/management, counseling/educating on disease processes, explaining results of diagnostic testing.     Patient discussed " "with attending, Dr. Maryam Quigley PA-C  Trauma, Critical Care, and Acute Care Surgery  [id number]     ==============================================================================  PAST MEDICAL HISTORY:   PMH: Denies  No past medical history on file.  Last menstrual period: unknown    PSH: Alice, \"tummy tuck\", partial hysterectomy, Gastric bypass, lumbar spine for scoliosis   No past surgical history on file.  FH: unknown  No family history on file.  SOCIAL HISTORY:    Smoking: denies   Social History     Tobacco Use   Smoking Status Not on file   Smokeless Tobacco Not on file       Alcohol: occasional    Social History     Substance and Sexual Activity   Alcohol Use Not on file       Drug use: denies    MEDICATIONS: Supplements   Prior to Admission medications    Not on File     ALLERGIES: NKDA  No Known Allergies    REVIEW OF SYSTEMS:  Review of Systems   Constitutional:  Negative for activity change and appetite change.   HENT:  Negative for ear pain.    Eyes:  Negative for visual disturbance.   Respiratory:  Positive for shortness of breath. Negative for wheezing.    Cardiovascular:  Negative for chest pain.   Gastrointestinal:  Negative for abdominal distention, abdominal pain, diarrhea, nausea and vomiting.   Genitourinary:  Negative for difficulty urinating and flank pain.   Musculoskeletal:  Negative for back pain and neck pain.   Neurological:  Negative for seizures, weakness, numbness and headaches.   Psychiatric/Behavioral:  Negative for agitation.      PHYSICAL EXAM:  PRIMARY SURVEY:  Airway  Airway is patent.     Breathing  Breathing is normal. Right breath sounds are normal. Left breath sounds are normal.     Circulation  Cardiac rhythm is regular. Rate is regular.   Pulses  Radial: 2+ on the right; 2+ on the left.  Femoral: 2+ on the right; 2+ on the left.  Pedal: 2+ on the right; 2+ on the left.    Disability  Capac Coma Score  Eye:4      Motor:6        Pupils  Right Pupil:   round and " reactive        Left Pupil:   round and reactive           Motor Strength   strength:  5/5 on the right  5/5 on the left  Dorsiflex strength:  5/5 on the right  5/5 on the left  Plantarflex strength:  5/5 on the right  5/5 on the left  The patient does not have a sensory deficit.       SECONDARY SURVEY/PHYSICAL EXAM:  Physical Exam  Constitutional:       Appearance: Normal appearance.   HENT:      Head: Atraumatic.      Right Ear: External ear normal.      Left Ear: External ear normal.      Nose: Nose normal.      Mouth/Throat:      Mouth: Mucous membranes are moist.   Eyes:      Extraocular Movements: Extraocular movements intact.      Pupils: Pupils are equal, round, and reactive to light.   Cardiovascular:      Rate and Rhythm: Normal rate.      Pulses: Normal pulses.   Pulmonary:      Effort: Pulmonary effort is normal. No respiratory distress.      Breath sounds: Normal breath sounds.      Comments: L chest wall ttp.  Chest tube in place to suction w/o appreciable air leak  Marginal sanguinous output in atrium  Chest:      Chest wall: Tenderness present.   Abdominal:      General: Abdomen is flat. There is no distension.      Palpations: Abdomen is soft.   Musculoskeletal:         General: No tenderness. Normal range of motion.      Cervical back: Normal range of motion. No tenderness.      Comments: Surgical scars to back   Skin:     General: Skin is warm.   Neurological:      General: No focal deficit present.      Mental Status: She is alert and oriented to person, place, and time.      Sensory: No sensory deficit.      Motor: No weakness.   Psychiatric:         Mood and Affect: Mood normal.         Behavior: Behavior normal.       IMAGING SUMMARY:  (summary of findings, not a copy of dictation)  CT Head/Face: n/a  CT C-Spine: n/a  CT Chest/Abd/Pelvis: n/a  CXR/PXR: L CT in appropriate position   Other(s): n/a    LABS:  Results from last 7 days   Lab Units 04/17/24  0200   WBC AUTO x10*3/uL 5.3    HEMOGLOBIN g/dL 11.8*   HEMATOCRIT % 33.9*   PLATELETS AUTO x10*3/uL 187         Results from last 7 days   Lab Units 04/17/24  0200   SODIUM mmol/L 138   POTASSIUM mmol/L 3.6   CHLORIDE mmol/L 106   CO2 mmol/L 24   BUN mg/dL 11   CREATININE mg/dL 0.58   CALCIUM mg/dL 8.8   GLUCOSE mg/dL 96               I have reviewed all laboratory and imaging results ordered/pertinent for this encounter.

## 2024-04-17 NOTE — ED PROVIDER NOTES
CC: Fall     HPI:  Patient is a healthy 33-year-old female presenting to the ED as a transfer from outside hospital for pneumothorax after a fall.  Patient states she fell last Friday on 4/12 while vacationing in [place].  States she tripped while putting on her shorts and struck her left chest into the sharp edge of a chair.  States she initially went to hospital in [place] and diagnosed with a rib fractures.  States she flew home yesterday continue to have less chest pain and developed difficulty breathing therefore presented to an urgent care and was told she had a pneumothorax.  Chest tube was placed at outside hospital and she was transferred here for further management.  Endorses some left chest pain but denies any other injuries or pain.  No head strike or loss of consciousness.      Limitations to History: None    Records Reviewed:  Recent available ED and inpatient notes reviewed in EMR.    PMHx/PSHx:  Per HPI.   - has no past medical history on file.  - has no past surgical history on file.    Medications:  Reviewed in EMR. See EMR for complete list of medications and doses.    Allergies:  Patient has no known allergies.    Social History:  - Tobacco:  reports that she has never smoked. She has never been exposed to tobacco smoke. She has never used smokeless tobacco.   - Alcohol:  reports current alcohol use of about 3.0 standard drinks of alcohol per week.   - Illicit Drugs:  reports no history of drug use.     ROS:  Per HPI.     ???????????????????????????????????????????????????????????????  Triage Vitals:  T 36.8 °C (98.2 °F)  HR 70  /85  RR 18  O2 100 % None (Room air)    PHYSICAL EXAM:   VS: As documented in the triage note and EMR flowsheet from this visit were reviewed.  Gen: Well developed.  Appears comfortable.  Eyes: pupils equally round, Clear scerla.  HENT: NC/AT, Mucosal membranes moist.   Neck: Supple, tracheal midline  Resp: Non-labored breathing on RA, CTAB  Chest: L chest wall  TTP with chest tube in place to suction, no air leak, scant amount of sanguinous output in atrium  CV: regular rate, extremities well perfused  Abd: Soft, non-distended, non-tender  Skin: WWP. No systemic rashes or lesions.  MSK: normal muscle bulk, no obvious joint swelling in extremities  Neuro:  AAOx3, speech fluent, MAEx4, no facial droop  Psych: Appropriate mood and affect  ???????????????????????????????????????????????????????????????    ED Labs/Imaging:   Labs Reviewed   CBC - Abnormal       Result Value    WBC 5.3      nRBC 0.0      RBC 3.72 (*)     Hemoglobin 11.8 (*)     Hematocrit 33.9 (*)     MCV 91      MCH 31.7      MCHC 34.8      RDW 11.4 (*)     Platelets 187     CBC - Abnormal    WBC 5.0      nRBC 0.0      RBC 3.79 (*)     Hemoglobin 11.9 (*)     Hematocrit 34.8 (*)     MCV 92      MCH 31.4      MCHC 34.2      RDW 11.1 (*)     Platelets 178     RENAL FUNCTION PANEL - Normal    Glucose 96      Sodium 138      Potassium 3.6      Chloride 106      Bicarbonate 24      Anion Gap 12      Urea Nitrogen 11      Creatinine 0.58      eGFR >90      Calcium 8.8      Phosphorus 3.8      Albumin 3.6     MAGNESIUM - Normal    Magnesium 1.90     TYPE AND SCREEN    ABO TYPE O      Rh TYPE NEG      ANTIBODY SCREEN NEG      Narrative:     Review your Rh Negative female patient's potential need for Rh Immune Globulin (RhIg)administration.     XR chest 1 view         XR chest 1 view   Final Result   1.  Left apical chest tube, no definitive pneumothorax.                  MACRO:   None        Signed by: Nicol Jama 4/17/2024 1:48 PM   Dictation workstation:   [id number]      XR chest 2 views   Final Result   1. Left chest tube projected over the left lung apex. No definitive   residual pneumothorax.   2. Streaky left basilar airspace opacities, likely subsegmental   atelectasis.        Signed by: Carroll Gotti 4/17/2024 1:14 AM   Dictation workstation:   [id number]      XR chest 1 view    (Results Pending)          ED Course & MDM   Diagnoses as of 04/18/24 0031   Fall, initial encounter   Traumatic pneumothorax, initial encounter           Medical Decision Making  This is a healthy 33-year-old female presenting as a transfer for trauma surgery evaluation in the setting of rib fractures with pneumothorax status post chest tube.  Patient well-appearing not in acute distress on room air.  Chest tube appears to be in place to suction with no airleak.  Trauma surgery was consulted and repeat x-rays obtained.  Chest x-ray reviewed and showed appropriate chest tube placement without definitive residual pneumothorax.  Multimodal pain control initiated and patient will be admitted to the trauma floor service for further management.  No additional injuries found on exam.  Admitted in stable condition.    Social Determinants Limiting Care:  Trauma    Disposition:  As a result of their workup, the patient will require admission to the hospital.  The patient was informed of their diagnosis.  Patient was given the opportunity to ask questions and answered them.  Patient agreed to be admitted to the hospital.    Patient seen and discussed with attending physician.    Britt Lee MD PGY3  Emergency Medicine      Procedures ? Smart[employer] last updated 4/18/2024 12:31 AM          Britt Lee MD  Resident  04/18/24 0034

## 2024-04-17 NOTE — CARE PLAN
The patient's goals for the shift include      The clinical goals for the shift include free of injury    Over the shift, the patient will continue to progress towards her care plan goals

## 2024-04-18 ENCOUNTER — APPOINTMENT (OUTPATIENT)
Dept: RADIOLOGY | Facility: HOSPITAL | Age: 34
DRG: 200 | End: 2024-04-18
Payer: COMMERCIAL

## 2024-04-18 PROCEDURE — 71045 X-RAY EXAM CHEST 1 VIEW: CPT | Performed by: RADIOLOGY

## 2024-04-18 PROCEDURE — 71045 X-RAY EXAM CHEST 1 VIEW: CPT

## 2024-04-18 PROCEDURE — 1100000001 HC PRIVATE ROOM DAILY

## 2024-04-18 PROCEDURE — 99232 SBSQ HOSP IP/OBS MODERATE 35: CPT | Performed by: STUDENT IN AN ORGANIZED HEALTH CARE EDUCATION/TRAINING PROGRAM

## 2024-04-18 PROCEDURE — 2500000004 HC RX 250 GENERAL PHARMACY W/ HCPCS (ALT 636 FOR OP/ED): Performed by: PHYSICIAN ASSISTANT

## 2024-04-18 PROCEDURE — 2500000005 HC RX 250 GENERAL PHARMACY W/O HCPCS: Performed by: PHYSICIAN ASSISTANT

## 2024-04-18 PROCEDURE — 2500000001 HC RX 250 WO HCPCS SELF ADMINISTERED DRUGS (ALT 637 FOR MEDICARE OP): Performed by: PHYSICIAN ASSISTANT

## 2024-04-18 PROCEDURE — 2500000001 HC RX 250 WO HCPCS SELF ADMINISTERED DRUGS (ALT 637 FOR MEDICARE OP): Performed by: STUDENT IN AN ORGANIZED HEALTH CARE EDUCATION/TRAINING PROGRAM

## 2024-04-18 RX ORDER — BUTALBITAL, ACETAMINOPHEN AND CAFFEINE 50; 325; 40 MG/1; MG/1; MG/1
1 TABLET ORAL ONCE
Status: COMPLETED | OUTPATIENT
Start: 2024-04-18 | End: 2024-04-18

## 2024-04-18 RX ORDER — KETOROLAC TROMETHAMINE 15 MG/ML
15 INJECTION, SOLUTION INTRAMUSCULAR; INTRAVENOUS ONCE
Qty: 1 ML | Refills: 0 | Status: COMPLETED | OUTPATIENT
Start: 2024-04-18 | End: 2024-04-19

## 2024-04-18 RX ADMIN — SENNOSIDES AND DOCUSATE SODIUM 2 TABLET: 8.6; 5 TABLET ORAL at 08:24

## 2024-04-18 RX ADMIN — SENNOSIDES AND DOCUSATE SODIUM 2 TABLET: 8.6; 5 TABLET ORAL at 21:30

## 2024-04-18 RX ADMIN — LIDOCAINE 1 PATCH: 4 PATCH TOPICAL at 08:25

## 2024-04-18 RX ADMIN — ACETAMINOPHEN 650 MG: 325 TABLET ORAL at 12:54

## 2024-04-18 RX ADMIN — OXYCODONE HYDROCHLORIDE 5 MG: 5 TABLET ORAL at 21:30

## 2024-04-18 RX ADMIN — METHOCARBAMOL TABLETS 500 MG: 500 TABLET, COATED ORAL at 03:58

## 2024-04-18 RX ADMIN — ACETAMINOPHEN 650 MG: 325 TABLET ORAL at 18:07

## 2024-04-18 RX ADMIN — ENOXAPARIN SODIUM 30 MG: 100 INJECTION SUBCUTANEOUS at 03:58

## 2024-04-18 RX ADMIN — METHOCARBAMOL TABLETS 500 MG: 500 TABLET, COATED ORAL at 21:30

## 2024-04-18 RX ADMIN — METHOCARBAMOL TABLETS 500 MG: 500 TABLET, COATED ORAL at 10:15

## 2024-04-18 RX ADMIN — BUTALBITAL, ACETAMINOPHEN, AND CAFFEINE 1 TABLET: 325; 50; 40 TABLET ORAL at 15:40

## 2024-04-18 RX ADMIN — ACETAMINOPHEN 650 MG: 325 TABLET ORAL at 21:30

## 2024-04-18 RX ADMIN — OXYCODONE HYDROCHLORIDE 5 MG: 5 TABLET ORAL at 18:06

## 2024-04-18 RX ADMIN — OXYCODONE HYDROCHLORIDE 5 MG: 5 TABLET ORAL at 03:58

## 2024-04-18 RX ADMIN — OXYCODONE HYDROCHLORIDE 10 MG: 5 TABLET ORAL at 08:23

## 2024-04-18 RX ADMIN — ACETAMINOPHEN 650 MG: 325 TABLET ORAL at 08:25

## 2024-04-18 RX ADMIN — ENOXAPARIN SODIUM 30 MG: 100 INJECTION SUBCUTANEOUS at 15:45

## 2024-04-18 ASSESSMENT — PAIN DESCRIPTION - LOCATION: LOCATION: CHEST

## 2024-04-18 ASSESSMENT — PAIN SCALES - GENERAL
PAINLEVEL_OUTOF10: 2
PAINLEVEL_OUTOF10: 0 - NO PAIN
PAINLEVEL_OUTOF10: 7
PAINLEVEL_OUTOF10: 5 - MODERATE PAIN
PAINLEVEL_OUTOF10: 8
PAINLEVEL_OUTOF10: 7
PAINLEVEL_OUTOF10: 1
PAINLEVEL_OUTOF10: 2
PAINLEVEL_OUTOF10: 6

## 2024-04-18 ASSESSMENT — COGNITIVE AND FUNCTIONAL STATUS - GENERAL
DAILY ACTIVITIY SCORE: 24
MOBILITY SCORE: 24

## 2024-04-18 ASSESSMENT — PAIN - FUNCTIONAL ASSESSMENT
PAIN_FUNCTIONAL_ASSESSMENT: 0-10

## 2024-04-18 NOTE — PROGRESS NOTES
"[facility]  TRAUMA SERVICE - PROGRESS NOTE    Patient Name: Tanika Amador  MRN: [id number]  Admit Date: 416  : 1990  AGE: 33 y.o.   GENDER: female  ==============================================================================  MECHANISM OF INJURY:   33 YOF transferred from OSH s/p fall last Friday,  while vacationing in [place]. Patient states that she tripped while putting on her shorts and struck her left chest into the sharp edge of a chair.  Initially went to hospital in [place] where imaging obtained and noted \"a rib fracture\". She ultimately flew home yesterday, 4/15, and continued to have left chest pain and difficulty breathing.  She presented to an urgent care where CXR noted ptx. She then was transferred to OS where L ptx again noted with multiple rib fx.  Chest tube placed and patient transferred to [facility] for further care. On arrival, patient states that she has some left chest pain but that is all. She has been tolerating a diet for the last 5 days w/o concerns.      LOC (yes/no?): no  Anticoagulant / Anti-platelet Rx? (for what dx?): no  Referring Facility Name (N/A for scene EMR run): [facility]     INJURIES:   L 7th rib fx with ptx     OTHER MEDICAL PROBLEMS:  None     INCIDENTAL FINDINGS:  N/a    ==============================================================================  TODAY'S ASSESSMENT AND PLAN OF CARE:  ## left 7th rib fx, residual ptx  - multimodal pain control with tylenol, robaxin, lidocaine patch, prn oxy   - chest tube to water seal, repeat CXR  am ordered, if lung still up possible pull in am. If repeat CXR in afternoon with lung still up possible discharge home  vs Saturday     ## FEN/GI  - regular diet  - BR     ## DVT ppx  - lovenox   - SCDs    Dispo: continue RNF for chest tube management, PT/OT home no needs     Pt seen and discussed with attending Dr. García    Total face to face time spent with patient/family of " 20 minutes, with >50% of the time spent discussing plan of care/management, counseling/educating on disease processes, explaining results of diagnostic testing.     Marianne Stover PA-C  Trauma, Critical Care, Acute Care Surgery   Floor: [id number]  TSICU: [id number]     ==============================================================================  CHIEF COMPLAINT / OVERNIGHT EVENTS:   No acute events overnight. Pt more comfortable appearing this am, notes pain right where chest tube is. Pt states she thinks with chest tube out pain will be more tolerable. Pt eating and drinking ok.    MEDICAL HISTORY / ROS:  Admission history and ROS reviewed. Pertinent changes as follows:    PHYSICAL EXAM:  Heart Rate:  [57-65]   Temp:  [35.8 °C (96.4 °F)-36.3 °C (97.3 °F)]   Resp:  [18-19]   BP: (110-132)/(70-83)   SpO2:  [96 %-99 %]   Physical Exam  Vitals reviewed.   Constitutional:       General: She is not in acute distress.  HENT:      Head: Normocephalic and atraumatic.      Nose: Nose normal.      Mouth/Throat:      Mouth: Mucous membranes are moist.   Eyes:      Extraocular Movements: Extraocular movements intact.   Cardiovascular:      Rate and Rhythm: Normal rate.   Pulmonary:      Effort: Pulmonary effort is normal. No respiratory distress.   Abdominal:      General: Abdomen is flat. There is no distension.   Musculoskeletal:      Comments: Moving all extremities spontaneously   Skin:     General: Skin is warm and dry.   Neurological:      Mental Status: She is oriented to person, place, and time.      Comments: GCS 15    Psychiatric:         Mood and Affect: Mood normal.         Behavior: Behavior normal.         IMAGING SUMMARY:  (summary of new imaging findings, not a copy of dictation)    LABS:  Results from last 7 days   Lab Units 04/17/24  1200 04/17/24  0200   WBC AUTO x10*3/uL 5.0 5.3   HEMOGLOBIN g/dL 11.9* 11.8*   HEMATOCRIT % 34.8* 33.9*   PLATELETS AUTO x10*3/uL 178 187         Results from last 7 days   Lab Units  04/17/24  0200   SODIUM mmol/L 138   POTASSIUM mmol/L 3.6   CHLORIDE mmol/L 106   CO2 mmol/L 24   BUN mg/dL 11   CREATININE mg/dL 0.58   CALCIUM mg/dL 8.8   GLUCOSE mg/dL 96               I have reviewed all medications, laboratory results, and imaging pertinent for today's encounter.

## 2024-04-18 NOTE — HOSPITAL COURSE
"33 YOF transferred from OSH s/p fall last Friday, 4/12 while vacationing in [place]. Patient states that she tripped while putting on her shorts and struck her left chest into the sharp edge of a chair. Initially went to hospital in [place] where imaging obtained and noted \"a rib fracture\". She ultimately flew home yesterday, 4/15, and continued to have left chest pain and difficulty breathing. She presented to an urgent care where CXR noted ptx. She then was transferred to OSH where L ptx again noted with multiple rib fx. Chest tube placed and patient transferred to [facility] for further care. On arrival, CXR obtained to verify appropriate CT placement.  Patient admitted to [facility] for chest tube care and pain control. Chest tube placed to water seal on 4/17 w/ stable apical ptx noted. Patient's chest tube was later removed on 4/19, as her respiratory status had remained stable. Post pull CXR identified Pneumothorax and Pt required a PIG Tail catheter on 4/20. Pig tail was taken to water seal on 4/22. Pt has remained HDS on RA. Pig tail was removed 4/23, and subsequent CXR did not reveal any signs of PTX.  At time of discharge, patient was ambulating, tolerating regular diet, voiding adequately. Pain was well-controlled, and patient was saturating appropriately on room air. Patient to follow up with Trauma Surgery in 2 weeks for follow up.       "

## 2024-04-19 ENCOUNTER — APPOINTMENT (OUTPATIENT)
Dept: RADIOLOGY | Facility: HOSPITAL | Age: 34
DRG: 200 | End: 2024-04-19
Payer: COMMERCIAL

## 2024-04-19 PROCEDURE — 71045 X-RAY EXAM CHEST 1 VIEW: CPT | Performed by: RADIOLOGY

## 2024-04-19 PROCEDURE — 2500000001 HC RX 250 WO HCPCS SELF ADMINISTERED DRUGS (ALT 637 FOR MEDICARE OP): Performed by: PHYSICIAN ASSISTANT

## 2024-04-19 PROCEDURE — 71045 X-RAY EXAM CHEST 1 VIEW: CPT

## 2024-04-19 PROCEDURE — 2500000005 HC RX 250 GENERAL PHARMACY W/O HCPCS: Performed by: PHYSICIAN ASSISTANT

## 2024-04-19 PROCEDURE — 2500000004 HC RX 250 GENERAL PHARMACY W/ HCPCS (ALT 636 FOR OP/ED): Performed by: STUDENT IN AN ORGANIZED HEALTH CARE EDUCATION/TRAINING PROGRAM

## 2024-04-19 PROCEDURE — 1100000001 HC PRIVATE ROOM DAILY

## 2024-04-19 PROCEDURE — 99232 SBSQ HOSP IP/OBS MODERATE 35: CPT | Performed by: SURGERY

## 2024-04-19 PROCEDURE — 2500000001 HC RX 250 WO HCPCS SELF ADMINISTERED DRUGS (ALT 637 FOR MEDICARE OP): Performed by: STUDENT IN AN ORGANIZED HEALTH CARE EDUCATION/TRAINING PROGRAM

## 2024-04-19 PROCEDURE — 2500000004 HC RX 250 GENERAL PHARMACY W/ HCPCS (ALT 636 FOR OP/ED): Performed by: PHYSICIAN ASSISTANT

## 2024-04-19 RX ORDER — TRAMADOL HYDROCHLORIDE 50 MG/1
50 TABLET ORAL EVERY 6 HOURS PRN
Qty: 15 TABLET | Refills: 0 | Status: SHIPPED | OUTPATIENT
Start: 2024-04-19 | End: 2024-04-23 | Stop reason: HOSPADM

## 2024-04-19 RX ADMIN — SENNOSIDES AND DOCUSATE SODIUM 2 TABLET: 8.6; 5 TABLET ORAL at 20:19

## 2024-04-19 RX ADMIN — OXYCODONE HYDROCHLORIDE 5 MG: 5 TABLET ORAL at 01:47

## 2024-04-19 RX ADMIN — METHOCARBAMOL TABLETS 500 MG: 500 TABLET, COATED ORAL at 05:03

## 2024-04-19 RX ADMIN — ENOXAPARIN SODIUM 30 MG: 100 INJECTION SUBCUTANEOUS at 15:23

## 2024-04-19 RX ADMIN — OXYCODONE HYDROCHLORIDE 5 MG: 5 TABLET ORAL at 06:00

## 2024-04-19 RX ADMIN — ACETAMINOPHEN 650 MG: 325 TABLET ORAL at 09:09

## 2024-04-19 RX ADMIN — ACETAMINOPHEN 650 MG: 325 TABLET ORAL at 12:32

## 2024-04-19 RX ADMIN — ACETAMINOPHEN 650 MG: 325 TABLET ORAL at 00:30

## 2024-04-19 RX ADMIN — KETOROLAC TROMETHAMINE 15 MG: 15 INJECTION, SOLUTION INTRAMUSCULAR; INTRAVENOUS at 20:17

## 2024-04-19 RX ADMIN — LIDOCAINE 1 PATCH: 4 PATCH TOPICAL at 08:13

## 2024-04-19 RX ADMIN — SENNOSIDES AND DOCUSATE SODIUM 2 TABLET: 8.6; 5 TABLET ORAL at 08:13

## 2024-04-19 RX ADMIN — ACETAMINOPHEN 650 MG: 325 TABLET ORAL at 16:58

## 2024-04-19 RX ADMIN — ENOXAPARIN SODIUM 30 MG: 100 INJECTION SUBCUTANEOUS at 05:03

## 2024-04-19 RX ADMIN — METHOCARBAMOL TABLETS 500 MG: 500 TABLET, COATED ORAL at 22:15

## 2024-04-19 RX ADMIN — ACETAMINOPHEN 650 MG: 325 TABLET ORAL at 21:11

## 2024-04-19 ASSESSMENT — PAIN SCALES - WONG BAKER: WONGBAKER_NUMERICALRESPONSE: HURTS LITTLE BIT

## 2024-04-19 ASSESSMENT — PAIN - FUNCTIONAL ASSESSMENT
PAIN_FUNCTIONAL_ASSESSMENT: 0-10

## 2024-04-19 ASSESSMENT — PAIN DESCRIPTION - ORIENTATION: ORIENTATION: LEFT

## 2024-04-19 ASSESSMENT — PAIN SCALES - GENERAL
PAINLEVEL_OUTOF10: 6
PAINLEVEL_OUTOF10: 7
PAINLEVEL_OUTOF10: 6
PAINLEVEL_OUTOF10: 2
PAINLEVEL_OUTOF10: 2
PAINLEVEL_OUTOF10: 5 - MODERATE PAIN

## 2024-04-19 ASSESSMENT — PAIN DESCRIPTION - LOCATION: LOCATION: CHEST

## 2024-04-19 NOTE — PROGRESS NOTES
Transitional Care Coordinator Note: Per medical team (trauma) patient is not medically ready, pending chest tube removal. Discharge dispo: Plan for patient to discharge home with no needs, patient agreeable to discharge plan.       Radha Fernando RN BSN   Transitional Care Coordinator

## 2024-04-19 NOTE — CARE PLAN
Problem: Skin  Goal: Promote/optimize nutrition  Outcome: Progressing  Goal: Promote skin healing  Outcome: Progressing     Problem: Pain  Goal: Takes deep breaths with improved pain control throughout the shift  Outcome: Progressing  Goal: Performs ADL's with improved pain control throughout shift  Outcome: Progressing   The patient's goals for the shift include      The clinical goals for the shift include free from falls and pain control    Over the shift, the patient did not make progress toward the following goals. Barriers to progression include pain  Recommendations to address these barriers include follow doctors disc plans

## 2024-04-19 NOTE — PROGRESS NOTES
"[facility]  TRAUMA SERVICE - PROGRESS NOTE    Patient Name: Tanika Amador  MRN: [id number]  Admit Date: 416  : 1990  AGE: 33 y.o.   GENDER: female  ==============================================================================  MECHANISM OF INJURY:   33 YOF transferred from OSH s/p fall last Friday,  while vacationing in [place]. Patient states that she tripped while putting on her shorts and struck her left chest into the sharp edge of a chair.  Initially went to hospital in [place] where imaging obtained and noted \"a rib fracture\". She ultimately flew home yesterday, 4/15, and continued to have left chest pain and difficulty breathing.  She presented to an urgent care where CXR noted ptx. She then was transferred to [facility] where L ptx again noted with multiple rib fx.  Chest tube placed and patient transferred to [facility] for further care. On arrival, patient states that she has some left chest pain but that is all. She has been tolerating a diet for the last 5 days w/o concerns.      LOC (yes/no?): no  Anticoagulant / Anti-platelet Rx? (for what dx?): no  Referring Facility Name (N/A for scene EMR run): [facility]     INJURIES:   L 7th rib fx with ptx     OTHER MEDICAL PROBLEMS:  None     INCIDENTAL FINDINGS:  N/a    ==============================================================================  TODAY'S ASSESSMENT AND PLAN OF CARE:  ## left 7th rib fx, residual ptx  - multimodal pain control with tylenol, robaxin, lidocaine patch, prn oxy   - removed chest tube earlier today, . Small apical PTX evidence on post pull X-ray   - will repeat CXR in AM, if no worsening of pneumo okay for discharge home     ## FEN/GI  - regular diet  - BR     ## DVT ppx  - lovenox   - SCDs    Dispo: continue RNF for chest tube management, PT/OT home no needs     Pt seen and discussed with attending Dr. Raquel Pichardo PGY1  Trauma Surgery "   ==============================================================================  CHIEF COMPLAINT / OVERNIGHT EVENTS:   No acute events overnight. Patient tolerating regular diet, voiding adequately. Saturating appropriately on room air.     MEDICAL HISTORY / ROS:  Admission history and ROS reviewed. Pertinent changes as follows:    PHYSICAL EXAM:  Heart Rate:  [62-90]   Temp:  [35.2 °C (95.3 °F)-36.4 °C (97.6 °F)]   Resp:  [18]   BP: (116-137)/(73-84)   SpO2:  [94 %-97 %]   Physical Exam  Vitals reviewed.   Constitutional:       General: She is not in acute distress.  HENT:      Head: Normocephalic and atraumatic.      Nose: Nose normal.      Mouth/Throat:      Mouth: Mucous membranes are moist.   Eyes:      Extraocular Movements: Extraocular movements intact.   Cardiovascular:      Rate and Rhythm: Normal rate.   Pulmonary:      Effort: Pulmonary effort is normal. No respiratory distress.   Abdominal:      General: Abdomen is flat. There is no distension.   Musculoskeletal:      Comments: Moving all extremities spontaneously   Skin:     General: Skin is warm and dry.   Neurological:      Mental Status: She is oriented to person, place, and time.      Comments: GCS 15    Psychiatric:         Mood and Affect: Mood normal.         Behavior: Behavior normal.         IMAGING SUMMARY:  (summary of new imaging findings, not a copy of dictation)    LABS:  Results from last 7 days   Lab Units 04/17/24  1200 04/17/24  0200   WBC AUTO x10*3/uL 5.0 5.3   HEMOGLOBIN g/dL 11.9* 11.8*   HEMATOCRIT % 34.8* 33.9*   PLATELETS AUTO x10*3/uL 178 187         Results from last 7 days   Lab Units 04/17/24  0200   SODIUM mmol/L 138   POTASSIUM mmol/L 3.6   CHLORIDE mmol/L 106   CO2 mmol/L 24   BUN mg/dL 11   CREATININE mg/dL 0.58   CALCIUM mg/dL 8.8   GLUCOSE mg/dL 96               I have reviewed all medications, laboratory results, and imaging pertinent for today's encounter.

## 2024-04-19 NOTE — DISCHARGE INSTRUCTIONS
[facility]  DISCHARGE INSTRUCTIONS    You were admitted to [facility] from 4/17/24 - 4/19/24 for management of your rib fractures and pneumothorax. You sustained the following injuries: left 7th rib fracture with pneumothorax. You underwent chest tube placement at an Outside Hospital for treatment of your pneumothorax. Your chest tube was later removed, and you had a small remaining apical pneumothorax. Due to your symptomatic shortness of breath, in addition to the residual pneumothorax, you had a pigtail placed. It helped to resolve your pneumothorax. It was removed two days later, and the subsequent chest x-ray did not show any pneumothorax.     GENERAL INSTRUCTIONS  1) Diet: You may resume your regular home diet     2) Activity:   - Move around as you are able  - Avoiding driving when your pain is not well controlled. If your pain would hinder you from slamming the breaks or turning your head from side to side, avoid driving  - Refrain from driving if you have used any narcotic medication in the last 48-72 hours. These medications can slow down your reaction time and impair your judgment.  -Avoid strenuous activities including sudden movements, heavy lifting that would worsen the pain in your ribs. Your rib fracture will heal by itself overtime. You will also notice the rib fracture pain resolving overtime. Healing takes about 6 weeks. In addition to the pain medication, you can also apply an ice pack to the area throughout the day.   -Continue with slow deep-breathing and gentle coughing exercises as frequently as possible. It is ok to do some low impact exercise until you see us back in clinic. Avoid crunches, pushing, pulling, or lifting heavy objects.     3) Medications:   - Take tylenol 650 mg every 6 hours as needed for your pain. You can alternate this every 3 hours with Ibuprofen (example: take tylenol at 6 am, noon, 6 pm and take  ibuprofen at 9 am, 3 pm, 9 pm).  - If your are still in pain, you may take 50 mg(1 tablet) of oxycodone every 4-6 hours as needed for 7-10 or greater pain.     4) Return precautions -- Call the on call numbers listed for trauma surgery below if you have any of the following symptoms:  [phone]  - Fever > 100.5 F (>38 C), chills  -uncontrolled nausea and/or vomiting  -Chest pain  -new or worsening shortness of breath  -have new bruising, rashes, blistering on your body  -new or worsening swelling  -uncontrolled pain    6) Wound care:  Your chest tube removal site is covered with an occlusive dressing and gauze. Please keep this dressing on for the next 48 hours. It is okay to shower but avoiding getting dressing wet during this period of time. After 48 hours, you may remove this occlusive dressing. Your skin will close overtime. You also have a small pigtail site which will also close overtime. It is normal to have some draining, which will   - Avoid using hot tubs, pools, or taking baths for the next 4-6 weeks. Avoid airplanes until you see is in clinic post-operatively.     ADDITIONAL INSTRUCTIONS    FOLLOW-UP APPOINTMENTS:  Trauma Surgery Clinic  You will follow-up with this clinic in 2 weeks after discharge. If you do not hear from them within 1 week of discharge with appointment information, please call [phone] to schedule your appointment.

## 2024-04-20 ENCOUNTER — APPOINTMENT (OUTPATIENT)
Dept: RADIOLOGY | Facility: HOSPITAL | Age: 34
DRG: 200 | End: 2024-04-20
Payer: COMMERCIAL

## 2024-04-20 PROCEDURE — 32551 INSERTION OF CHEST TUBE: CPT | Performed by: SURGERY

## 2024-04-20 PROCEDURE — 1100000001 HC PRIVATE ROOM DAILY

## 2024-04-20 PROCEDURE — 2500000005 HC RX 250 GENERAL PHARMACY W/O HCPCS

## 2024-04-20 PROCEDURE — 2500000004 HC RX 250 GENERAL PHARMACY W/ HCPCS (ALT 636 FOR OP/ED)

## 2024-04-20 PROCEDURE — 2500000001 HC RX 250 WO HCPCS SELF ADMINISTERED DRUGS (ALT 637 FOR MEDICARE OP): Performed by: STUDENT IN AN ORGANIZED HEALTH CARE EDUCATION/TRAINING PROGRAM

## 2024-04-20 PROCEDURE — 71045 X-RAY EXAM CHEST 1 VIEW: CPT | Performed by: RADIOLOGY

## 2024-04-20 PROCEDURE — 2500000001 HC RX 250 WO HCPCS SELF ADMINISTERED DRUGS (ALT 637 FOR MEDICARE OP): Performed by: PHYSICIAN ASSISTANT

## 2024-04-20 PROCEDURE — 99232 SBSQ HOSP IP/OBS MODERATE 35: CPT | Performed by: SURGERY

## 2024-04-20 PROCEDURE — 0W9B30Z DRAINAGE OF LEFT PLEURAL CAVITY WITH DRAINAGE DEVICE, PERCUTANEOUS APPROACH: ICD-10-PCS | Performed by: STUDENT IN AN ORGANIZED HEALTH CARE EDUCATION/TRAINING PROGRAM

## 2024-04-20 PROCEDURE — 71045 X-RAY EXAM CHEST 1 VIEW: CPT

## 2024-04-20 PROCEDURE — 2500000004 HC RX 250 GENERAL PHARMACY W/ HCPCS (ALT 636 FOR OP/ED): Performed by: PHYSICIAN ASSISTANT

## 2024-04-20 RX ORDER — HYDROMORPHONE HYDROCHLORIDE 1 MG/ML
0.2 INJECTION, SOLUTION INTRAMUSCULAR; INTRAVENOUS; SUBCUTANEOUS ONCE
Status: COMPLETED | OUTPATIENT
Start: 2024-04-20 | End: 2024-04-20

## 2024-04-20 RX ORDER — BUTALBITAL, ACETAMINOPHEN AND CAFFEINE 50; 325; 40 MG/1; MG/1; MG/1
1 TABLET ORAL ONCE
Qty: 1 TABLET | Refills: 0 | Status: DISCONTINUED | OUTPATIENT
Start: 2024-04-20 | End: 2024-04-23 | Stop reason: HOSPADM

## 2024-04-20 RX ORDER — ONDANSETRON HYDROCHLORIDE 2 MG/ML
4 INJECTION, SOLUTION INTRAVENOUS EVERY 4 HOURS PRN
Status: DISCONTINUED | OUTPATIENT
Start: 2024-04-20 | End: 2024-04-23 | Stop reason: HOSPADM

## 2024-04-20 RX ORDER — LIDOCAINE HYDROCHLORIDE 10 MG/ML
INJECTION INFILTRATION; PERINEURAL
Status: COMPLETED
Start: 2024-04-20 | End: 2024-04-20

## 2024-04-20 RX ORDER — LIDOCAINE 560 MG/1
1 PATCH PERCUTANEOUS; TOPICAL; TRANSDERMAL DAILY
Status: DISCONTINUED | OUTPATIENT
Start: 2024-04-20 | End: 2024-04-23 | Stop reason: HOSPADM

## 2024-04-20 RX ORDER — LORAZEPAM 2 MG/ML
0.5 INJECTION INTRAMUSCULAR ONCE
Status: COMPLETED | OUTPATIENT
Start: 2024-04-20 | End: 2024-04-20

## 2024-04-20 RX ADMIN — ENOXAPARIN SODIUM 30 MG: 100 INJECTION SUBCUTANEOUS at 16:59

## 2024-04-20 RX ADMIN — ACETAMINOPHEN 650 MG: 325 TABLET ORAL at 01:20

## 2024-04-20 RX ADMIN — ACETAMINOPHEN 650 MG: 325 TABLET ORAL at 18:45

## 2024-04-20 RX ADMIN — SENNOSIDES AND DOCUSATE SODIUM 2 TABLET: 8.6; 5 TABLET ORAL at 20:45

## 2024-04-20 RX ADMIN — HYDROMORPHONE HYDROCHLORIDE 0.2 MG: 1 INJECTION, SOLUTION INTRAMUSCULAR; INTRAVENOUS; SUBCUTANEOUS at 14:34

## 2024-04-20 RX ADMIN — LIDOCAINE 1 PATCH: 4 PATCH TOPICAL at 17:01

## 2024-04-20 RX ADMIN — SENNOSIDES AND DOCUSATE SODIUM 2 TABLET: 8.6; 5 TABLET ORAL at 08:24

## 2024-04-20 RX ADMIN — HYDROMORPHONE HYDROCHLORIDE 0.2 MG: 1 INJECTION, SOLUTION INTRAMUSCULAR; INTRAVENOUS; SUBCUTANEOUS at 13:52

## 2024-04-20 RX ADMIN — ACETAMINOPHEN 650 MG: 325 TABLET ORAL at 14:34

## 2024-04-20 RX ADMIN — HYDROMORPHONE HYDROCHLORIDE 0.2 MG: 1 INJECTION, SOLUTION INTRAMUSCULAR; INTRAVENOUS; SUBCUTANEOUS at 16:59

## 2024-04-20 RX ADMIN — OXYCODONE HYDROCHLORIDE 5 MG: 5 TABLET ORAL at 23:09

## 2024-04-20 RX ADMIN — ENOXAPARIN SODIUM 30 MG: 100 INJECTION SUBCUTANEOUS at 04:51

## 2024-04-20 RX ADMIN — LIDOCAINE HYDROCHLORIDE: 10 INJECTION, SOLUTION INFILTRATION; PERINEURAL at 14:15

## 2024-04-20 RX ADMIN — ACETAMINOPHEN 650 MG: 325 TABLET ORAL at 10:35

## 2024-04-20 RX ADMIN — ACETAMINOPHEN 650 MG: 325 TABLET ORAL at 05:39

## 2024-04-20 RX ADMIN — LORAZEPAM 0.5 MG: 2 INJECTION INTRAMUSCULAR; INTRAVENOUS at 13:52

## 2024-04-20 RX ADMIN — METHOCARBAMOL TABLETS 500 MG: 500 TABLET, COATED ORAL at 21:47

## 2024-04-20 RX ADMIN — OXYCODONE HYDROCHLORIDE 5 MG: 5 TABLET ORAL at 18:45

## 2024-04-20 RX ADMIN — METHOCARBAMOL TABLETS 500 MG: 500 TABLET, COATED ORAL at 16:59

## 2024-04-20 RX ADMIN — METHOCARBAMOL TABLETS 500 MG: 500 TABLET, COATED ORAL at 10:35

## 2024-04-20 RX ADMIN — METHOCARBAMOL TABLETS 500 MG: 500 TABLET, COATED ORAL at 04:51

## 2024-04-20 RX ADMIN — HYDROMORPHONE HYDROCHLORIDE 0.2 MG: 1 INJECTION, SOLUTION INTRAMUSCULAR; INTRAVENOUS; SUBCUTANEOUS at 20:54

## 2024-04-20 ASSESSMENT — PAIN SCALES - GENERAL
PAINLEVEL_OUTOF10: 5 - MODERATE PAIN
PAINLEVEL_OUTOF10: 0 - NO PAIN
PAINLEVEL_OUTOF10: 7
PAINLEVEL_OUTOF10: 2
PAINLEVEL_OUTOF10: 6
PAINLEVEL_OUTOF10: 6

## 2024-04-20 ASSESSMENT — PAIN - FUNCTIONAL ASSESSMENT
PAIN_FUNCTIONAL_ASSESSMENT: 0-10

## 2024-04-20 ASSESSMENT — COGNITIVE AND FUNCTIONAL STATUS - GENERAL
MOBILITY SCORE: 24
DAILY ACTIVITIY SCORE: 24
DAILY ACTIVITIY SCORE: 24
MOBILITY SCORE: 24

## 2024-04-20 ASSESSMENT — PAIN DESCRIPTION - ORIENTATION
ORIENTATION: LEFT
ORIENTATION: LEFT

## 2024-04-20 ASSESSMENT — PAIN DESCRIPTION - LOCATION
LOCATION: CHEST
LOCATION: CHEST

## 2024-04-20 ASSESSMENT — PAIN SCALES - WONG BAKER: WONGBAKER_NUMERICALRESPONSE: HURTS LITTLE BIT

## 2024-04-20 NOTE — PROCEDURES
Patient has hx of traumatic ptx on left s/p left sided thoracostomy tube placement and removal on 4/19. Subsequently noted to have recurrence of symptoms with cxr showing recurrent ptx. Subsequently decision made to place left sided pigtail catheter. Patient agreeable with this plan. Premedication with ativan and dilaudid given. Skin site identified, approximately 4th - 5th intercostal space (just posterior to prior skin incision site from prior thoracostomy tube). Prepped with CHG. Sterile drape applied. Area was anesthetized with 15mL of 1% lidocaine. An introducer needle was advanced just over the superior border of the inferior rib with return of air confirming entrance into pleural space. A guidewire was advanced into the pleural space without resistance. Finder needle was withdrawn over wire. Skin knick was made with 11-blade to facilitate dilator passage. Track was dilated into the pleural cavity over wire with relative ease. This was then exchanged with a pigtail catheter successfully. Inner obturator and wire withdrawn from pigtail catheter which was then connected to suction via atrium without appreciable air leak. Catheter secured at skin site with silk suture. Patient tolerated procedure well. Post-placement cxr ordered.      Torito Hu MD  General Surgery, pgy3      Patient discussed with attending.

## 2024-04-20 NOTE — PROGRESS NOTES
"[facility]  TRAUMA SERVICE - PROGRESS NOTE    Patient Name: Tanika Amador  MRN: [id number]  Admit Date: 416  : 1990  AGE: 33 y.o.   GENDER: female  ==============================================================================  MECHANISM OF INJURY:   33 YOF transferred from OSH s/p fall last Friday,  while vacationing in [place]. Patient states that she tripped while putting on her shorts and struck her left chest into the sharp edge of a chair.  Initially went to hospital in [place] where imaging obtained and noted \"a rib fracture\". She ultimately flew home yesterday, 4/15, and continued to have left chest pain and difficulty breathing.  She presented to an urgent care where CXR noted ptx. She then was transferred to OS where L ptx again noted with multiple rib fx.  Chest tube placed and patient transferred to [facility] for further care. On arrival, patient states that she has some left chest pain but that is all. She has been tolerating a diet for the last 5 days w/o concerns.      LOC (yes/no?): no  Anticoagulant / Anti-platelet Rx? (for what dx?): no  Referring Facility Name (N/A for scene EMR run): [facility]     INJURIES:   L 7th rib fx with ptx     OTHER MEDICAL PROBLEMS:  None     INCIDENTAL FINDINGS:  N/a    ==============================================================================  TODAY'S ASSESSMENT AND PLAN OF CARE:  ## left 7th rib fx, residual ptx  - multimodal pain control with tylenol, robaxin, lidocaine patch, prn oxy   - removed chest tube earlier today, . Small apical PTX evidence on post pull X-ray   - s/p pigtail placement   -cxr obtained post pigtail placement demonstrated interval decrease in PTX size     ## FEN/GI  - regular diet  - BR     ## DVT ppx  - lovenox   - SCDs    Dispo: continue RNF for chest tube management, PT/OT home no needs     Pt seen and discussed with attending Dr. Raquel Pichardo PGY1  Trauma Surgery "   ==============================================================================  CHIEF COMPLAINT / OVERNIGHT EVENTS:   Patient endorsed SOB     MEDICAL HISTORY / ROS:  Admission history and ROS reviewed. Pertinent changes as follows:    PHYSICAL EXAM:  Heart Rate:  [54-89]   Temp:  [35.3 °C (95.6 °F)-36.9 °C (98.4 °F)]   Resp:  [17-18]   BP: (106-127)/(57-83)   SpO2:  [96 %-99 %]   Physical Exam  Vitals reviewed.   Constitutional:       General: She is not in acute distress.  HENT:      Head: Normocephalic and atraumatic.      Nose: Nose normal.      Mouth/Throat:      Mouth: Mucous membranes are moist.   Eyes:      Extraocular Movements: Extraocular movements intact.   Cardiovascular:      Rate and Rhythm: Normal rate.   Pulmonary:      Effort: Pulmonary effort is normal. No respiratory distress.   Abdominal:      General: Abdomen is flat. There is no distension.   Musculoskeletal:      Comments: Moving all extremities spontaneously   Skin:     General: Skin is warm and dry.   Neurological:      Mental Status: She is oriented to person, place, and time.      Comments: GCS 15    Psychiatric:         Mood and Affect: Mood normal.         Behavior: Behavior normal.       IMAGING SUMMARY:  (summary of new imaging findings, not a copy of dictation)    LABS:  Results from last 7 days   Lab Units 04/17/24  1200 04/17/24  0200   WBC AUTO x10*3/uL 5.0 5.3   HEMOGLOBIN g/dL 11.9* 11.8*   HEMATOCRIT % 34.8* 33.9*   PLATELETS AUTO x10*3/uL 178 187         Results from last 7 days   Lab Units 04/17/24  0200   SODIUM mmol/L 138   POTASSIUM mmol/L 3.6   CHLORIDE mmol/L 106   CO2 mmol/L 24   BUN mg/dL 11   CREATININE mg/dL 0.58   CALCIUM mg/dL 8.8   GLUCOSE mg/dL 96               I have reviewed all medications, laboratory results, and imaging pertinent for today's encounter.

## 2024-04-20 NOTE — PROGRESS NOTES
"[facility]  TRAUMA SERVICE - PROGRESS NOTE    Patient Name: Tanika Amador  MRN: [id number]  Admit Date: 416  : 1990  AGE: 33 y.o.   GENDER: female  ==============================================================================  MECHANISM OF INJURY:   33 YOF transferred from OSH s/p fall last Friday,  while vacationing in [place]. Patient states that she tripped while putting on her shorts and struck her left chest into the sharp edge of a chair.  Initially went to hospital in [place] where imaging obtained and noted \"a rib fracture\". She ultimately flew home yesterday, 4/15, and continued to have left chest pain and difficulty breathing.  She presented to an urgent care where CXR noted ptx. She then was transferred to OS where L ptx again noted with multiple rib fx.  Chest tube placed and patient transferred to [facility] for further care. On arrival, patient states that she has some left chest pain but that is all. She has been tolerating a diet for the last 5 days w/o concerns.      LOC (yes/no?): no  Anticoagulant / Anti-platelet Rx? (for what dx?): no  Referring Facility Name (N/A for scene EMR run): [facility]     INJURIES:   L 7th rib fx with ptx     OTHER MEDICAL PROBLEMS:  None     INCIDENTAL FINDINGS:  N/a    ==============================================================================  TODAY'S ASSESSMENT AND PLAN OF CARE:  ## left 7th rib fx, residual ptx  - multimodal pain control with tylenol, robaxin, lidocaine patch, prn oxy   - removed chest tube earlier today, . Small apical PTX evidence on post pull X-ray   - s/p pigtail placement   -cxr obtained post pigtail placement demonstrated interval decrease in PTX size   - will maintain pigtail to sxn for now     ## FEN/GI  - regular diet  - BR     ## DVT ppx  - lovenox   - SCDs    Dispo: continue RNF for chest tube management, PT/OT home no needs     Pt seen and discussed with attending  " Raquel Pichardo PGY1  Trauma Surgery   ==============================================================================  CHIEF COMPLAINT / OVERNIGHT EVENTS:   Patient endorsed SOB     MEDICAL HISTORY / ROS:  Admission history and ROS reviewed. Pertinent changes as follows:    PHYSICAL EXAM:  Heart Rate:  [54-89]   Temp:  [35.3 °C (95.6 °F)-36.9 °C (98.4 °F)]   Resp:  [17-18]   BP: (106-127)/(57-83)   SpO2:  [96 %-99 %]     Constitutional: NAD   Respiratory: subjective labored breathing, pigtail in place in left midaxillary line, covered with occlusive dressing  Cardiovascular: nontachycardic  Abdomen: soft, nontender  MSK: Normal ROM   Neuro: conversant, no focal deficits     IMAGING SUMMARY:  (summary of new imaging findings, not a copy of dictation)    LABS:  Results from last 7 days   Lab Units 04/17/24  1200 04/17/24  0200   WBC AUTO x10*3/uL 5.0 5.3   HEMOGLOBIN g/dL 11.9* 11.8*   HEMATOCRIT % 34.8* 33.9*   PLATELETS AUTO x10*3/uL 178 187         Results from last 7 days   Lab Units 04/17/24  0200   SODIUM mmol/L 138   POTASSIUM mmol/L 3.6   CHLORIDE mmol/L 106   CO2 mmol/L 24   BUN mg/dL 11   CREATININE mg/dL 0.58   CALCIUM mg/dL 8.8   GLUCOSE mg/dL 96             I have reviewed all medications, laboratory results, and imaging pertinent for today's encounter.

## 2024-04-20 NOTE — DISCHARGE SUMMARY
"Discharge Diagnosis  Fall, initial encounter    Issues Requiring Follow-Up  Follow up with Trauma Surgery Clinic in 2 weeks     Test Results Pending At Discharge  Pending Labs       No current pending labs.            Hospital Course  33 YOF transferred from OSH s/p fall last Friday, 4/12 while vacationing in [place]. Patient states that she tripped while putting on her shorts and struck her left chest into the sharp edge of a chair. Initially went to hospital in [place] where imaging obtained and noted \"a rib fracture\". She ultimately flew home yesterday, 4/15, and continued to have left chest pain and difficulty breathing. She presented to an urgent care where CXR noted ptx. She then was transferred to OSH where L ptx again noted with multiple rib fx. Chest tube placed and patient transferred to [facility] for further care. On arrival, CXR obtained to verify appropriate CT placement.  Patient admitted to [facility] for chest tube care and pain control. Chest tube placed to water seal on 4/17 w/ stable apical ptx noted. Patient's chest tube was lateral removed on 4/19, as her respiratory status had remained stable. Post-pull chest x-ray demonstrated stable apical ptx, which was unchanged on chest x-ray ordered 4/20/24. At time of discharge, patient was ambulating, tolerating regular diet, voiding adequately. Pain was well-controlled, and patient was saturating appropriately on room air. Patient to follow up with Trauma Surgery in 2 weeks for follow up.     Pertinent Physical Exam At Time of Discharge  Constitutional: NAD  Respiratory: unlabored breathing on RA, occlusive dressing intact over prior chest tube site  Cardiovascular: nontachycardic  Abdomen: soft, nontender, nondistended  MSK: normal ROM  Skin: warm and dry     Home Medications     Medication List      START taking these medications     traMADol 50 mg tablet; Commonly known as: Ultram; Take 1 tablet (50 mg)   by mouth every 6 hours if needed for severe pain (7 - " 10).     CONTINUE taking these medications     ASHWAGANDHA EXTRACT ORAL   BERBERINE-HERBAL COMB NO.18 ORAL   calcium citrate 200 mg (950 mg) tablet; Commonly known as: Calcitrate   CHROMIUM ORAL   creatine 100 % powder powder   cyanocobalamin 500 mcg tablet; Commonly known as: Vitamin B-12   ferrous sulfate 325 (65 Fe) MG EC tablet   folic acid 1 mg tablet; Commonly known as: Folvite   MAGNESIUM CITRATE ORAL   multivitamin capsule   THEANINE ORAL       Outpatient Follow-Up  No future appointments.    Finn Pichardo MD

## 2024-04-20 NOTE — CARE PLAN
Problem: Pain  Goal: My pain/discomfort is manageable  Outcome: Progressing     Problem: Safety  Goal: Patient will be injury free during hospitalization  Outcome: Progressing  Goal: I will remain free of falls  Outcome: Progressing     Problem: Daily Care  Goal: Daily care needs are met  Outcome: Progressing     Problem: Psychosocial Needs  Goal: Demonstrates ability to cope with hospitalization/illness  Outcome: Progressing  Goal: Collaborate with me, my family, and caregiver to identify my specific goals  Outcome: Progressing     Problem: Discharge Barriers  Goal: My discharge needs are met  Outcome: Progressing   The patient's goals for the shift include      The clinical goals for the shift include No SOB on excertion    Over the shift, the patient did not make progress toward the following goals. Barriers to progression include pain . Recommendations to address these barriers include pt breathing.

## 2024-04-21 ENCOUNTER — APPOINTMENT (OUTPATIENT)
Dept: RADIOLOGY | Facility: HOSPITAL | Age: 34
DRG: 200 | End: 2024-04-21
Payer: COMMERCIAL

## 2024-04-21 PROCEDURE — 2500000001 HC RX 250 WO HCPCS SELF ADMINISTERED DRUGS (ALT 637 FOR MEDICARE OP): Performed by: PHYSICIAN ASSISTANT

## 2024-04-21 PROCEDURE — 99232 SBSQ HOSP IP/OBS MODERATE 35: CPT | Performed by: SURGERY

## 2024-04-21 PROCEDURE — 2500000001 HC RX 250 WO HCPCS SELF ADMINISTERED DRUGS (ALT 637 FOR MEDICARE OP): Performed by: STUDENT IN AN ORGANIZED HEALTH CARE EDUCATION/TRAINING PROGRAM

## 2024-04-21 PROCEDURE — 1100000001 HC PRIVATE ROOM DAILY

## 2024-04-21 PROCEDURE — 71045 X-RAY EXAM CHEST 1 VIEW: CPT

## 2024-04-21 PROCEDURE — 71045 X-RAY EXAM CHEST 1 VIEW: CPT | Performed by: RADIOLOGY

## 2024-04-21 PROCEDURE — 2500000005 HC RX 250 GENERAL PHARMACY W/O HCPCS: Performed by: PHYSICIAN ASSISTANT

## 2024-04-21 PROCEDURE — 2500000004 HC RX 250 GENERAL PHARMACY W/ HCPCS (ALT 636 FOR OP/ED): Performed by: PHYSICIAN ASSISTANT

## 2024-04-21 RX ADMIN — OXYCODONE HYDROCHLORIDE 10 MG: 5 TABLET ORAL at 12:22

## 2024-04-21 RX ADMIN — ACETAMINOPHEN 650 MG: 325 TABLET ORAL at 16:48

## 2024-04-21 RX ADMIN — ACETAMINOPHEN 650 MG: 325 TABLET ORAL at 05:49

## 2024-04-21 RX ADMIN — ENOXAPARIN SODIUM 30 MG: 100 INJECTION SUBCUTANEOUS at 16:12

## 2024-04-21 RX ADMIN — ACETAMINOPHEN 650 MG: 325 TABLET ORAL at 02:27

## 2024-04-21 RX ADMIN — METHOCARBAMOL TABLETS 500 MG: 500 TABLET, COATED ORAL at 05:08

## 2024-04-21 RX ADMIN — METHOCARBAMOL TABLETS 500 MG: 500 TABLET, COATED ORAL at 16:12

## 2024-04-21 RX ADMIN — OXYCODONE HYDROCHLORIDE 5 MG: 5 TABLET ORAL at 05:48

## 2024-04-21 RX ADMIN — ACETAMINOPHEN 650 MG: 325 TABLET ORAL at 10:25

## 2024-04-21 RX ADMIN — HYDROMORPHONE HYDROCHLORIDE 0.2 MG: 1 INJECTION, SOLUTION INTRAMUSCULAR; INTRAVENOUS; SUBCUTANEOUS at 02:26

## 2024-04-21 RX ADMIN — METHOCARBAMOL TABLETS 500 MG: 500 TABLET, COATED ORAL at 10:25

## 2024-04-21 RX ADMIN — HYDROMORPHONE HYDROCHLORIDE 0.2 MG: 1 INJECTION, SOLUTION INTRAMUSCULAR; INTRAVENOUS; SUBCUTANEOUS at 20:05

## 2024-04-21 RX ADMIN — LIDOCAINE 1 PATCH: 4 PATCH TOPICAL at 08:26

## 2024-04-21 RX ADMIN — HYDROMORPHONE HYDROCHLORIDE 0.2 MG: 1 INJECTION, SOLUTION INTRAMUSCULAR; INTRAVENOUS; SUBCUTANEOUS at 08:35

## 2024-04-21 RX ADMIN — OXYCODONE HYDROCHLORIDE 10 MG: 5 TABLET ORAL at 21:57

## 2024-04-21 RX ADMIN — ENOXAPARIN SODIUM 30 MG: 100 INJECTION SUBCUTANEOUS at 05:06

## 2024-04-21 RX ADMIN — SENNOSIDES AND DOCUSATE SODIUM 2 TABLET: 8.6; 5 TABLET ORAL at 20:05

## 2024-04-21 RX ADMIN — ACETAMINOPHEN 650 MG: 325 TABLET ORAL at 20:40

## 2024-04-21 RX ADMIN — SENNOSIDES AND DOCUSATE SODIUM 2 TABLET: 8.6; 5 TABLET ORAL at 08:27

## 2024-04-21 RX ADMIN — HYDROMORPHONE HYDROCHLORIDE 0.2 MG: 1 INJECTION, SOLUTION INTRAMUSCULAR; INTRAVENOUS; SUBCUTANEOUS at 14:43

## 2024-04-21 RX ADMIN — METHOCARBAMOL TABLETS 500 MG: 500 TABLET, COATED ORAL at 22:52

## 2024-04-21 ASSESSMENT — COGNITIVE AND FUNCTIONAL STATUS - GENERAL
MOBILITY SCORE: 24
DAILY ACTIVITIY SCORE: 24
MOBILITY SCORE: 24
DAILY ACTIVITIY SCORE: 24

## 2024-04-21 ASSESSMENT — PAIN - FUNCTIONAL ASSESSMENT
PAIN_FUNCTIONAL_ASSESSMENT: 0-10
PAIN_FUNCTIONAL_ASSESSMENT: PAINAD (PAIN ASSESSMENT IN ADVANCED DEMENTIA SCALE)
PAIN_FUNCTIONAL_ASSESSMENT: 0-10
PAIN_FUNCTIONAL_ASSESSMENT: CPOT (CRITICAL CARE PAIN OBSERVATION TOOL)
PAIN_FUNCTIONAL_ASSESSMENT: 0-10

## 2024-04-21 ASSESSMENT — PAIN DESCRIPTION - ORIENTATION
ORIENTATION: LEFT

## 2024-04-21 ASSESSMENT — PAIN DESCRIPTION - LOCATION
LOCATION: CHEST

## 2024-04-21 ASSESSMENT — PAIN SCALES - GENERAL
PAINLEVEL_OUTOF10: 7
PAINLEVEL_OUTOF10: 0 - NO PAIN
PAINLEVEL_OUTOF10: 7
PAINLEVEL_OUTOF10: 3
PAINLEVEL_OUTOF10: 3
PAINLEVEL_OUTOF10: 7
PAINLEVEL_OUTOF10: 3
PAINLEVEL_OUTOF10: 3
PAINLEVEL_OUTOF10: 7

## 2024-04-21 ASSESSMENT — PAIN SCALES - WONG BAKER: WONGBAKER_NUMERICALRESPONSE: NO HURT

## 2024-04-21 NOTE — PROGRESS NOTES
"[facility]  TRAUMA SERVICE - PROGRESS NOTE    Patient Name: Tanika Amador  MRN: [id number]  Admit Date: 416  : 1990  AGE: 33 y.o.   GENDER: female  ==============================================================================  MECHANISM OF INJURY:   33 YOF transferred from OSH s/p fall last Friday,  while vacationing in [place]. Patient states that she tripped while putting on her shorts and struck her left chest into the sharp edge of a chair.  Initially went to hospital in [place] where imaging obtained and noted \"a rib fracture\". She ultimately flew home yesterday, 4/15, and continued to have left chest pain and difficulty breathing.  She presented to an urgent care where CXR noted ptx. She then was transferred to OS where L ptx again noted with multiple rib fx.  Chest tube placed and patient transferred to [facility] for further care. On arrival, patient states that she has some left chest pain but that is all. She has been tolerating a diet for the last 5 days w/o concerns.      LOC (yes/no?): no  Anticoagulant / Anti-platelet Rx? (for what dx?): no  Referring Facility Name (N/A for scene EMR run): [facility]     INJURIES:   L 7th rib fx with ptx     OTHER MEDICAL PROBLEMS:  None     INCIDENTAL FINDINGS:  N/a    ==============================================================================  TODAY'S ASSESSMENT AND PLAN OF CARE:  ## left 7th rib fx, residual ptx  - multimodal pain control with tylenol, robaxin, lidocaine patch, prn oxy   - removed chest tube earlier today, . Small apical PTX evidence on post pull X-ray   - s/p pigtail placement   -cxr obtained post pigtail placement demonstrated interval decrease in PTX size   - will maintain pigtail to sxn for now   -daily CXR, possible to water seal tomorrow     ## FEN/GI  - regular diet  - BR     ## DVT ppx  - lovenox   - SCDs    Dispo: continue RNF for chest tube management, PT/OT home no needs     Pt seen and " discussed with attending Dr. Raquel Pichardo PGY1  Trauma Surgery   ==============================================================================  CHIEF COMPLAINT / OVERNIGHT EVENTS:   No acute events overnight. CXR from this AM reveals interval decrease in PTX size. Patient does not report any SOB. Endorses mild tenderness along pigtail placement site.     MEDICAL HISTORY / ROS:  Admission history and ROS reviewed. Pertinent changes as follows:    PHYSICAL EXAM:  Heart Rate:  [55-74]   Temp:  [36.4 °C (97.5 °F)-37.6 °C (99.7 °F)]   Resp:  [16-18]   BP: (113-127)/(73-83)   SpO2:  [96 %-100 %]     Constitutional: NAD   Respiratory: pigtail in place in left midaxillary line, covered with occlusive dressing, to suction without any evidence of airleak   Cardiovascular: nontachycardic  Abdomen: soft, nontender  MSK: Normal ROM   Neuro: conversant, no focal deficits     IMAGING SUMMARY:  (summary of new imaging findings, not a copy of dictation)    LABS:  Results from last 7 days   Lab Units 04/17/24  1200 04/17/24  0200   WBC AUTO x10*3/uL 5.0 5.3   HEMOGLOBIN g/dL 11.9* 11.8*   HEMATOCRIT % 34.8* 33.9*   PLATELETS AUTO x10*3/uL 178 187         Results from last 7 days   Lab Units 04/17/24  0200   SODIUM mmol/L 138   POTASSIUM mmol/L 3.6   CHLORIDE mmol/L 106   CO2 mmol/L 24   BUN mg/dL 11   CREATININE mg/dL 0.58   CALCIUM mg/dL 8.8   GLUCOSE mg/dL 96             I have reviewed all medications, laboratory results, and imaging pertinent for today's encounter.

## 2024-04-21 NOTE — CARE PLAN
The patient's goals for the shift include      The clinical goals for the shift include No SOB on excertion    Over the shift, the patient will continue to progress towards her care plan goals

## 2024-04-21 NOTE — CARE PLAN
Problem: Pain  Goal: My pain/discomfort is manageable  Outcome: Progressing     Problem: Safety  Goal: Patient will be injury free during hospitalization  Outcome: Progressing  Goal: I will remain free of falls  Outcome: Progressing     Problem: Daily Care  Goal: Daily care needs are met  Outcome: Progressing     Problem: Psychosocial Needs  Goal: Demonstrates ability to cope with hospitalization/illness  Outcome: Progressing  Goal: Collaborate with me, my family, and caregiver to identify my specific goals  Outcome: Progressing     Problem: Discharge Barriers  Goal: My discharge needs are met  Outcome: Progressing   The patient's goals for the shift include      The clinical goals for the shift include pt will show no signs of SOB    Over the shift, the patient did not make progress toward the following goals. Barriers to progression include chest tube Recommendations to address these barriers include pain relief

## 2024-04-22 ENCOUNTER — APPOINTMENT (OUTPATIENT)
Dept: RADIOLOGY | Facility: HOSPITAL | Age: 34
DRG: 200 | End: 2024-04-22
Payer: COMMERCIAL

## 2024-04-22 LAB
ATRIAL RATE: 67 BPM
P AXIS: 37 DEGREES
P OFFSET: 196 MS
P ONSET: 147 MS
PR INTERVAL: 136 MS
Q ONSET: 215 MS
QRS COUNT: 11 BEATS
QRS DURATION: 102 MS
QT INTERVAL: 414 MS
QTC CALCULATION(BAZETT): 437 MS
QTC FREDERICIA: 429 MS
R AXIS: 61 DEGREES
T AXIS: 54 DEGREES
T OFFSET: 422 MS
VENTRICULAR RATE: 67 BPM

## 2024-04-22 PROCEDURE — 2500000005 HC RX 250 GENERAL PHARMACY W/O HCPCS: Performed by: PHYSICIAN ASSISTANT

## 2024-04-22 PROCEDURE — 2500000004 HC RX 250 GENERAL PHARMACY W/ HCPCS (ALT 636 FOR OP/ED): Performed by: PHYSICIAN ASSISTANT

## 2024-04-22 PROCEDURE — 71045 X-RAY EXAM CHEST 1 VIEW: CPT

## 2024-04-22 PROCEDURE — 1100000001 HC PRIVATE ROOM DAILY

## 2024-04-22 PROCEDURE — 2500000001 HC RX 250 WO HCPCS SELF ADMINISTERED DRUGS (ALT 637 FOR MEDICARE OP): Performed by: PHYSICIAN ASSISTANT

## 2024-04-22 PROCEDURE — 71045 X-RAY EXAM CHEST 1 VIEW: CPT | Performed by: RADIOLOGY

## 2024-04-22 PROCEDURE — 2500000001 HC RX 250 WO HCPCS SELF ADMINISTERED DRUGS (ALT 637 FOR MEDICARE OP): Performed by: SURGERY

## 2024-04-22 PROCEDURE — 2500000004 HC RX 250 GENERAL PHARMACY W/ HCPCS (ALT 636 FOR OP/ED): Performed by: SURGERY

## 2024-04-22 PROCEDURE — 2500000001 HC RX 250 WO HCPCS SELF ADMINISTERED DRUGS (ALT 637 FOR MEDICARE OP): Performed by: STUDENT IN AN ORGANIZED HEALTH CARE EDUCATION/TRAINING PROGRAM

## 2024-04-22 PROCEDURE — 99232 SBSQ HOSP IP/OBS MODERATE 35: CPT | Performed by: SURGERY

## 2024-04-22 RX ORDER — METHOCARBAMOL 500 MG/1
1000 TABLET, FILM COATED ORAL EVERY 6 HOURS
Status: DISCONTINUED | OUTPATIENT
Start: 2024-04-22 | End: 2024-04-23 | Stop reason: HOSPADM

## 2024-04-22 RX ORDER — IBUPROFEN 600 MG/1
600 TABLET ORAL EVERY 6 HOURS SCHEDULED
Status: DISCONTINUED | OUTPATIENT
Start: 2024-04-22 | End: 2024-04-23 | Stop reason: HOSPADM

## 2024-04-22 RX ORDER — HYDROMORPHONE HYDROCHLORIDE 1 MG/ML
0.2 INJECTION, SOLUTION INTRAMUSCULAR; INTRAVENOUS; SUBCUTANEOUS EVERY 4 HOURS PRN
Status: DISCONTINUED | OUTPATIENT
Start: 2024-04-22 | End: 2024-04-23 | Stop reason: HOSPADM

## 2024-04-22 RX ADMIN — OXYCODONE HYDROCHLORIDE 10 MG: 5 TABLET ORAL at 11:48

## 2024-04-22 RX ADMIN — ENOXAPARIN SODIUM 30 MG: 100 INJECTION SUBCUTANEOUS at 15:31

## 2024-04-22 RX ADMIN — SENNOSIDES AND DOCUSATE SODIUM 2 TABLET: 8.6; 5 TABLET ORAL at 20:41

## 2024-04-22 RX ADMIN — ENOXAPARIN SODIUM 30 MG: 100 INJECTION SUBCUTANEOUS at 03:17

## 2024-04-22 RX ADMIN — METHOCARBAMOL TABLETS 500 MG: 500 TABLET, COATED ORAL at 09:40

## 2024-04-22 RX ADMIN — METHOCARBAMOL 1000 MG: 500 TABLET ORAL at 15:31

## 2024-04-22 RX ADMIN — OXYCODONE HYDROCHLORIDE 10 MG: 5 TABLET ORAL at 17:49

## 2024-04-22 RX ADMIN — OXYCODONE HYDROCHLORIDE 10 MG: 5 TABLET ORAL at 06:09

## 2024-04-22 RX ADMIN — HYDROMORPHONE HYDROCHLORIDE 0.2 MG: 1 INJECTION, SOLUTION INTRAMUSCULAR; INTRAVENOUS; SUBCUTANEOUS at 13:41

## 2024-04-22 RX ADMIN — ACETAMINOPHEN 650 MG: 325 TABLET ORAL at 03:14

## 2024-04-22 RX ADMIN — METHOCARBAMOL 1000 MG: 500 TABLET ORAL at 23:12

## 2024-04-22 RX ADMIN — ACETAMINOPHEN 650 MG: 325 TABLET ORAL at 09:40

## 2024-04-22 RX ADMIN — HYDROMORPHONE HYDROCHLORIDE 0.2 MG: 1 INJECTION, SOLUTION INTRAMUSCULAR; INTRAVENOUS; SUBCUTANEOUS at 03:14

## 2024-04-22 RX ADMIN — HYDROMORPHONE HYDROCHLORIDE 0.2 MG: 1 INJECTION, SOLUTION INTRAMUSCULAR; INTRAVENOUS; SUBCUTANEOUS at 09:40

## 2024-04-22 RX ADMIN — ACETAMINOPHEN 650 MG: 325 TABLET ORAL at 17:49

## 2024-04-22 RX ADMIN — ACETAMINOPHEN 650 MG: 325 TABLET ORAL at 05:37

## 2024-04-22 RX ADMIN — LIDOCAINE 1 PATCH: 4 PATCH TOPICAL at 09:40

## 2024-04-22 RX ADMIN — SENNOSIDES AND DOCUSATE SODIUM 2 TABLET: 8.6; 5 TABLET ORAL at 09:40

## 2024-04-22 RX ADMIN — ACETAMINOPHEN 650 MG: 325 TABLET ORAL at 13:41

## 2024-04-22 RX ADMIN — ACETAMINOPHEN 650 MG: 325 TABLET ORAL at 20:41

## 2024-04-22 RX ADMIN — HYDROMORPHONE HYDROCHLORIDE 0.2 MG: 1 INJECTION, SOLUTION INTRAMUSCULAR; INTRAVENOUS; SUBCUTANEOUS at 20:41

## 2024-04-22 RX ADMIN — METHOCARBAMOL TABLETS 500 MG: 500 TABLET, COATED ORAL at 03:15

## 2024-04-22 RX ADMIN — OXYCODONE HYDROCHLORIDE 10 MG: 5 TABLET ORAL at 23:12

## 2024-04-22 ASSESSMENT — PAIN DESCRIPTION - LOCATION
LOCATION: RIB CAGE
LOCATION: CHEST
LOCATION: CHEST

## 2024-04-22 ASSESSMENT — PAIN SCALES - GENERAL
PAINLEVEL_OUTOF10: 7
PAINLEVEL_OUTOF10: 2
PAINLEVEL_OUTOF10: 6
PAINLEVEL_OUTOF10: 0 - NO PAIN
PAINLEVEL_OUTOF10: 7
PAINLEVEL_OUTOF10: 2
PAINLEVEL_OUTOF10: 5 - MODERATE PAIN
PAINLEVEL_OUTOF10: 7
PAINLEVEL_OUTOF10: 0 - NO PAIN
PAINLEVEL_OUTOF10: 0 - NO PAIN
PAINLEVEL_OUTOF10: 5 - MODERATE PAIN

## 2024-04-22 ASSESSMENT — PAIN DESCRIPTION - ORIENTATION
ORIENTATION: LEFT

## 2024-04-22 ASSESSMENT — COGNITIVE AND FUNCTIONAL STATUS - GENERAL
MOBILITY SCORE: 24
DAILY ACTIVITIY SCORE: 24
DAILY ACTIVITIY SCORE: 24
MOBILITY SCORE: 24

## 2024-04-22 NOTE — CARE PLAN
Problem: Pain  Goal: My pain/discomfort is manageable  Outcome: Progressing     Problem: Safety  Goal: Patient will be injury free during hospitalization  Outcome: Progressing  Goal: I will remain free of falls  Outcome: Progressing     Problem: Daily Care  Goal: Daily care needs are met  Outcome: Progressing     Problem: Psychosocial Needs  Goal: Demonstrates ability to cope with hospitalization/illness  Outcome: Progressing  Goal: Collaborate with me, my family, and caregiver to identify my specific goals  Outcome: Progressing     Problem: Discharge Barriers  Goal: My discharge needs are met  Outcome: Progressing   The patient's goals for the shift include  Patient will ambulate at least once a shift     The clinical goals for the shift include Patient will use incentive spirometer hourly

## 2024-04-22 NOTE — PROGRESS NOTES
"[facility]  TRAUMA SERVICE - PROGRESS NOTE    Patient Name: Tanika Amador  MRN: [id number]  Admit Date: 416  : 1990  AGE: 33 y.o.   GENDER: female  ==============================================================================  MECHANISM OF INJURY:   33 YOF transferred from OSH s/p fall last Friday,  while vacationing in [place]. Patient states that she tripped while putting on her shorts and struck her left chest into the sharp edge of a chair.  Initially went to hospital in [place] where imaging obtained and noted \"a rib fracture\". She ultimately flew home yesterday, 4/15, and continued to have left chest pain and difficulty breathing.  She presented to an urgent care where CXR noted ptx. She then was transferred to OS where L ptx again noted with multiple rib fx.  Chest tube placed and patient transferred to [facility] for further care. On arrival, patient states that she has some left chest pain but that is all. She has been tolerating a diet for the last 5 days w/o concerns.      LOC (yes/no?): no  Anticoagulant / Anti-platelet Rx? (for what dx?): no  Referring Facility Name (N/A for scene EMR run): [facility]     INJURIES:   L 7th rib fx with ptx     OTHER MEDICAL PROBLEMS:  None     INCIDENTAL FINDINGS:  N/a    ==============================================================================  TODAY'S ASSESSMENT AND PLAN OF CARE:  ## left 7th rib fx, residual ptx  - multimodal pain control with tylenol, robaxin, lidocaine patch, prn oxy   - removed chest tube earlier today, . Small apical PTX evidence on post pull X-ray   - s/p pigtail placement   -cxr obtained post pigtail placement demonstrated interval decrease in PTX size   -CXR  still without evidence of ptx  -pigtail placed to waterseal today   -will maintain pigtail to waterseal for 24 hours   -obtain daily CXR, will re-evaluate for possible pigtail removal tomorrow     ##Pain control   - tylenol, " oxy 5, oxy 10, robaxin   - breakthrough dilaudid  - lidocaine patches as needed    ## FEN/GI  - regular diet  - BR     ## DVT ppx  - lovenox   - SCDs    Dispo: continue RNF for chest tube management, PT/OT home no needs     Pt seen and discussed with attending Dr. Redd.     Finn Pichardo PGY1  Trauma Surgery   ==============================================================================  CHIEF COMPLAINT / OVERNIGHT EVENTS:   No acute events overnight. Patient denied any SOB or chest pain, states that her pain is better controlled. Tolerating regular diet, had a BM yesterday, voiding adequately.     MEDICAL HISTORY / ROS:  Admission history and ROS reviewed. Pertinent changes as follows:    PHYSICAL EXAM:  Heart Rate:  [55-90]   Temp:  [35.6 °C (96.1 °F)-36.7 °C (98.1 °F)]   Resp:  [17-20]   BP: (120-141)/(76-91)   SpO2:  [98 %-99 %]     Constitutional: NAD   Respiratory: pigtail in place in left midaxillary line, covered with occlusive dressing, to waterseal without any evidence of airleak   Cardiovascular: nontachycardic  Abdomen: soft, nontender  MSK: Normal ROM   Neuro: conversant, no focal deficits     IMAGING SUMMARY:  (summary of new imaging findings, not a copy of dictation)    LABS:  Results from last 7 days   Lab Units 04/17/24  1200 04/17/24  0200   WBC AUTO x10*3/uL 5.0 5.3   HEMOGLOBIN g/dL 11.9* 11.8*   HEMATOCRIT % 34.8* 33.9*   PLATELETS AUTO x10*3/uL 178 187         Results from last 7 days   Lab Units 04/17/24  0200   SODIUM mmol/L 138   POTASSIUM mmol/L 3.6   CHLORIDE mmol/L 106   CO2 mmol/L 24   BUN mg/dL 11   CREATININE mg/dL 0.58   CALCIUM mg/dL 8.8   GLUCOSE mg/dL 96             I have reviewed all medications, laboratory results, and imaging pertinent for today's encounter.

## 2024-04-23 ENCOUNTER — APPOINTMENT (OUTPATIENT)
Dept: RADIOLOGY | Facility: HOSPITAL | Age: 34
DRG: 200 | End: 2024-04-23
Payer: COMMERCIAL

## 2024-04-23 VITALS
TEMPERATURE: 97.2 F | SYSTOLIC BLOOD PRESSURE: 127 MMHG | BODY MASS INDEX: 28.18 KG/M2 | OXYGEN SATURATION: 97 % | HEART RATE: 106 BPM | HEIGHT: 71 IN | RESPIRATION RATE: 18 BRPM | WEIGHT: 201.28 LBS | DIASTOLIC BLOOD PRESSURE: 81 MMHG

## 2024-04-23 PROCEDURE — 2500000001 HC RX 250 WO HCPCS SELF ADMINISTERED DRUGS (ALT 637 FOR MEDICARE OP): Performed by: PHYSICIAN ASSISTANT

## 2024-04-23 PROCEDURE — 2500000001 HC RX 250 WO HCPCS SELF ADMINISTERED DRUGS (ALT 637 FOR MEDICARE OP): Performed by: SURGERY

## 2024-04-23 PROCEDURE — 71045 X-RAY EXAM CHEST 1 VIEW: CPT

## 2024-04-23 PROCEDURE — 99238 HOSP IP/OBS DSCHRG MGMT 30/<: CPT | Performed by: SURGERY

## 2024-04-23 PROCEDURE — 2500000005 HC RX 250 GENERAL PHARMACY W/O HCPCS: Performed by: PHYSICIAN ASSISTANT

## 2024-04-23 PROCEDURE — 71045 X-RAY EXAM CHEST 1 VIEW: CPT | Performed by: RADIOLOGY

## 2024-04-23 PROCEDURE — 2500000004 HC RX 250 GENERAL PHARMACY W/ HCPCS (ALT 636 FOR OP/ED): Performed by: PHYSICIAN ASSISTANT

## 2024-04-23 RX ORDER — OXYCODONE HYDROCHLORIDE 5 MG/1
10 TABLET ORAL EVERY 6 HOURS PRN
Qty: 15 TABLET | Refills: 0 | Status: SHIPPED | OUTPATIENT
Start: 2024-04-23 | End: 2024-05-08

## 2024-04-23 RX ADMIN — LIDOCAINE 1 PATCH: 4 PATCH TOPICAL at 08:16

## 2024-04-23 RX ADMIN — METHOCARBAMOL 1000 MG: 500 TABLET ORAL at 05:38

## 2024-04-23 RX ADMIN — ACETAMINOPHEN 650 MG: 325 TABLET ORAL at 14:40

## 2024-04-23 RX ADMIN — ACETAMINOPHEN 650 MG: 325 TABLET ORAL at 05:38

## 2024-04-23 RX ADMIN — SENNOSIDES AND DOCUSATE SODIUM 2 TABLET: 8.6; 5 TABLET ORAL at 08:16

## 2024-04-23 RX ADMIN — ENOXAPARIN SODIUM 30 MG: 100 INJECTION SUBCUTANEOUS at 05:38

## 2024-04-23 RX ADMIN — ACETAMINOPHEN 650 MG: 325 TABLET ORAL at 08:16

## 2024-04-23 RX ADMIN — METHOCARBAMOL 1000 MG: 500 TABLET ORAL at 10:15

## 2024-04-23 ASSESSMENT — PAIN - FUNCTIONAL ASSESSMENT
PAIN_FUNCTIONAL_ASSESSMENT: 0-10
PAIN_FUNCTIONAL_ASSESSMENT: 0-10

## 2024-04-23 ASSESSMENT — PAIN SCALES - GENERAL: PAINLEVEL_OUTOF10: 3

## 2024-04-23 NOTE — CARE PLAN
Patient is alert and oriented x4 with stable vital signs. Patient reports some left sided rib discomfort and some lower back pain. Pain medication administered with good effect. Patient did report some mild nausea, but mentioned that this resolved spontaneously.   Frequent rounding completed for patient comfort and safety.   Problem: Pain  Goal: My pain/discomfort is manageable  Outcome: Progressing     Problem: Safety  Goal: Patient will be injury free during hospitalization  Outcome: Progressing     Problem: Daily Care  Goal: Daily care needs are met  Outcome: Progressing     Problem: Psychosocial Needs  Goal: Demonstrates ability to cope with hospitalization/illness  Outcome: Progressing      The clinical goals for the shift include Patient will use incentive spirometer hourly

## 2024-04-23 NOTE — PROGRESS NOTES
Transitional Care Coordinator Note: Per medical team (trauma) patient is medically ready. Discharge dispo: Plan for patient to discharge home with no needs. TCC updated patient on discharge plan, patient expressed appreciation of update and is agreeable to discharge plan. Per patient  to assist with transportation home.     EDOD: 4/23      Radha Fernando RN BSN   Transitional Care Coordinator

## 2024-04-23 NOTE — DISCHARGE SUMMARY
"Discharge Diagnosis  Fall, initial encounter    Issues Requiring Follow-Up  Trauma Surgery Clinic     Test Results Pending At Discharge  Pending Labs       No current pending labs.            Hospital Course  33 YOF transferred from OSH s/p fall last Friday, 4/12 while vacationing in [place]. Patient states that she tripped while putting on her shorts and struck her left chest into the sharp edge of a chair. Initially went to hospital in [place] where imaging obtained and noted \"a rib fracture\". She ultimately flew home yesterday, 4/15, and continued to have left chest pain and difficulty breathing. She presented to an urgent care where CXR noted ptx. She then was transferred to OSH where L ptx again noted with multiple rib fx. Chest tube placed and patient transferred to [facility] for further care. On arrival, CXR obtained to verify appropriate CT placement.  Patient admitted to [facility] for chest tube care and pain control. Chest tube placed to water seal on 4/17 w/ stable apical ptx noted. Patient's chest tube was later removed on 4/19, as her respiratory status had remained stable. Post pull CXR identified Pneumothorax and Pt required a PIG Tail catheter on 4/20. Pig tail was taken to eater seal on 4/22. Pt has remained HDS on RA. CXR on 4/23 demonstrated .....          At time of discharge, patient was ambulating, tolerating regular diet, voiding adequately. Pain was well-controlled, and patient was saturating appropriately on room air. Patient to follow up with Trauma Surgery in 2 weeks for follow up.       Pertinent Physical Exam At Time of Discharge  Constitutional: NAD   Respiratory: pigtail removed, covered with occlusive dressing  Cardiovascular: nontachycardic  Abdomen: soft, nontender  MSK: Normal ROM   Neuro: conversant, no focal deficits     Home Medications     Medication List      START taking these medications     oxyCODONE 5 mg immediate release tablet; Commonly known as: Roxicodone;   Take 2 tablets (10 mg) " by mouth every 6 hours if needed for severe pain (7   - 10) for up to 15 days.     CONTINUE taking these medications     ASHWAGANDHA EXTRACT ORAL   BERBERINE-HERBAL COMB NO.18 ORAL   calcium citrate 200 mg (950 mg) tablet; Commonly known as: Calcitrate   CHROMIUM ORAL   creatine 100 % powder powder   cyanocobalamin 500 mcg tablet; Commonly known as: Vitamin B-12   ferrous sulfate 325 (65 Fe) MG EC tablet   folic acid 1 mg tablet; Commonly known as: Folvite   MAGNESIUM CITRATE ORAL   multivitamin capsule   THEANINE ORAL       Outpatient Follow-Up  No future appointments.    Finn Pichardo MD

## 2024-04-25 ENCOUNTER — PATIENT OUTREACH (OUTPATIENT)
Dept: CARE COORDINATION | Facility: CLINIC | Age: 34
End: 2024-04-25
Payer: COMMERCIAL

## 2024-04-25 NOTE — PROGRESS NOTES
Discharge Facility: [facility]  Discharge Diagnosis: fall, rib fractures, pneumothorax  Admission Date: 4/16/24  Discharge Date: 4/23/24    PCP Appointment Date: Declined for now  Specialist Appointment Date: TBD trauma clinic at [facility]  Hospital Encounter and Summary: Luis Fernando Sagastume is doing much better since she left the hospital, removed her bandage from where chest tube was and no issues. Coughing and deep breathing and increasing activity as able. Will follow up with trauma clinic and call Dr Deras with any concerns after that.

## 2024-07-29 ENCOUNTER — LAB REQUISITION (OUTPATIENT)
Dept: LAB | Facility: HOSPITAL | Age: 34
End: 2024-07-29
Payer: COMMERCIAL

## 2024-07-29 DIAGNOSIS — Z31.83 ENCOUNTER FOR ASSISTED REPRODUCTIVE FERTILITY PROCEDURE CYCLE: ICD-10-CM

## 2024-07-29 LAB
ESTRADIOL SERPL-MCNC: 63 PG/ML
HOLD SPECIMEN: NORMAL
PROGEST SERPL-MCNC: 6.5 NG/ML

## 2024-07-29 PROCEDURE — 84144 ASSAY OF PROGESTERONE: CPT

## 2024-07-29 PROCEDURE — 82670 ASSAY OF TOTAL ESTRADIOL: CPT

## 2024-08-02 ENCOUNTER — LAB REQUISITION (OUTPATIENT)
Dept: LAB | Facility: HOSPITAL | Age: 34
End: 2024-08-02
Payer: COMMERCIAL

## 2024-08-02 DIAGNOSIS — Z31.83 ENCOUNTER FOR ASSISTED REPRODUCTIVE FERTILITY PROCEDURE CYCLE: ICD-10-CM

## 2024-08-02 LAB
ESTRADIOL SERPL-MCNC: 219 PG/ML
HOLD SPECIMEN: NORMAL
PROGEST SERPL-MCNC: 6.7 NG/ML

## 2024-08-02 PROCEDURE — 84144 ASSAY OF PROGESTERONE: CPT

## 2024-08-02 PROCEDURE — 82670 ASSAY OF TOTAL ESTRADIOL: CPT

## 2024-08-05 ENCOUNTER — LAB REQUISITION (OUTPATIENT)
Dept: LAB | Facility: HOSPITAL | Age: 34
End: 2024-08-05
Payer: COMMERCIAL

## 2024-08-05 DIAGNOSIS — Z31.83 ENCOUNTER FOR ASSISTED REPRODUCTIVE FERTILITY PROCEDURE CYCLE: ICD-10-CM

## 2024-08-05 LAB
ESTRADIOL SERPL-MCNC: 869 PG/ML
HOLD SPECIMEN: NORMAL
PROGEST SERPL-MCNC: 1.2 NG/ML

## 2024-08-05 PROCEDURE — 84144 ASSAY OF PROGESTERONE: CPT

## 2024-08-05 PROCEDURE — 82670 ASSAY OF TOTAL ESTRADIOL: CPT

## 2024-08-06 ENCOUNTER — LAB REQUISITION (OUTPATIENT)
Dept: LAB | Facility: HOSPITAL | Age: 34
End: 2024-08-06
Payer: COMMERCIAL

## 2024-08-06 DIAGNOSIS — Z31.83 ENCOUNTER FOR ASSISTED REPRODUCTIVE FERTILITY PROCEDURE CYCLE: ICD-10-CM

## 2024-08-06 LAB
ESTRADIOL SERPL-MCNC: 983 PG/ML
HOLD SPECIMEN: NORMAL
PROGEST SERPL-MCNC: 0.9 NG/ML

## 2024-08-06 PROCEDURE — 84144 ASSAY OF PROGESTERONE: CPT

## 2024-08-06 PROCEDURE — 82670 ASSAY OF TOTAL ESTRADIOL: CPT
